# Patient Record
Sex: FEMALE | Race: BLACK OR AFRICAN AMERICAN | NOT HISPANIC OR LATINO | URBAN - METROPOLITAN AREA
[De-identification: names, ages, dates, MRNs, and addresses within clinical notes are randomized per-mention and may not be internally consistent; named-entity substitution may affect disease eponyms.]

---

## 2017-08-22 ENCOUNTER — EMERGENCY (EMERGENCY)
Facility: HOSPITAL | Age: 27
LOS: 1 days | Discharge: HOME | End: 2017-08-22
Admitting: EMERGENCY MEDICINE
Payer: COMMERCIAL

## 2017-08-22 VITALS
DIASTOLIC BLOOD PRESSURE: 90 MMHG | OXYGEN SATURATION: 100 % | SYSTOLIC BLOOD PRESSURE: 111 MMHG | RESPIRATION RATE: 18 BRPM | TEMPERATURE: 98 F | HEART RATE: 50 BPM

## 2017-08-22 PROCEDURE — 99283 EMERGENCY DEPT VISIT LOW MDM: CPT | Mod: 25

## 2017-08-22 PROCEDURE — 12001 RPR S/N/AX/GEN/TRNK 2.5CM/<: CPT

## 2017-08-22 RX ORDER — TETANUS TOXOID, REDUCED DIPHTHERIA TOXOID AND ACELLULAR PERTUSSIS VACCINE, ADSORBED 5; 2.5; 8; 8; 2.5 [IU]/.5ML; [IU]/.5ML; UG/.5ML; UG/.5ML; UG/.5ML
0.5 SUSPENSION INTRAMUSCULAR ONCE
Qty: 0 | Refills: 0 | Status: COMPLETED | OUTPATIENT
Start: 2017-08-22 | End: 2017-08-22

## 2017-08-22 RX ADMIN — TETANUS TOXOID, REDUCED DIPHTHERIA TOXOID AND ACELLULAR PERTUSSIS VACCINE, ADSORBED 0.5 MILLILITER(S): 5; 2.5; 8; 8; 2.5 SUSPENSION INTRAMUSCULAR at 17:20

## 2017-08-22 NOTE — ED PROVIDER NOTE - PLAN OF CARE
Follow up with your PMD within 48-72 hours for wound check. Keep sutures covered and dry for 24 hours then clean with soap and water three times daily.  Apply bacitracin and cover.  Return to ED for suture removal 7-10 days. Any increased pain, redness, streaking (red lines), swelling, fever, chills return to ER.

## 2017-08-22 NOTE — ED PROVIDER NOTE - CARE PLAN
Principal Discharge DX:	Laceration of ankle  Instructions for follow-up, activity and diet:	Follow up with your PMD within 48-72 hours for wound check. Keep sutures covered and dry for 24 hours then clean with soap and water three times daily.  Apply bacitracin and cover.  Return to ED for suture removal 7-10 days. Any increased pain, redness, streaking (red lines), swelling, fever, chills return to ER.

## 2017-08-22 NOTE — ED PROVIDER NOTE - MEDICAL DECISION MAKING DETAILS
R ankle laceration with glass- low suspicion for fb given the superficial nature of the area over the medial malleolus- will do exploration, irrigation and wound closure. Adacel ordered.

## 2017-08-22 NOTE — ED ADULT TRIAGE NOTE - CHIEF COMPLAINT QUOTE
pt states a glass drinking cup fell to the floor and broke on tile and bounced up and cut her right inner ankle.  c/o laceration to area.  band-aid intake.

## 2017-08-22 NOTE — ED PROVIDER NOTE - OBJECTIVE STATEMENT
26 year old female, no PMHx, presents to the ED complaining of a right ankle laceration. Patient states at 0730 she accidentally dropped a drinking glass and it shattered hitting her right medial ankle, causing a laceration. Patient immediately applied pressure and a dressing but has continued to bleed prompting her to come to the ED. Last tetanus unknown, patient able to bear weight, FROM, no numbness, tingling, fevers, chills or other complaints.

## 2017-08-30 ENCOUNTER — EMERGENCY (EMERGENCY)
Facility: HOSPITAL | Age: 27
LOS: 1 days | Discharge: HOME | End: 2017-08-30
Admitting: EMERGENCY MEDICINE

## 2017-08-30 VITALS
OXYGEN SATURATION: 99 % | TEMPERATURE: 98 F | DIASTOLIC BLOOD PRESSURE: 96 MMHG | SYSTOLIC BLOOD PRESSURE: 135 MMHG | HEART RATE: 58 BPM | RESPIRATION RATE: 18 BRPM

## 2017-08-30 NOTE — ED PROVIDER NOTE - OBJECTIVE STATEMENT
26 year old female, no PMHx, presents to the ED for suture removal. Sutures placed by myself x 1 wk ago. She accidentally dropped a glass which came up and hit her ankle causing a laceration. She has had no drainage, erythema, f/c/n/v/d, pain or other complaints.

## 2017-08-30 NOTE — ED PROVIDER NOTE - PHYSICAL EXAMINATION
1.5 cm laceration on right medial malleolus with four sutures in place, well healing. No erythema, no discharge, no drainage, no tenderness

## 2017-08-30 NOTE — ED PROVIDER NOTE - PLAN OF CARE
Follow up with your PMD within 48-72 hours for wound check. Keep sutures covered and dry for 24 hours then clean with soap and water three times daily.  Apply bacitracin and cover.  Any increased pain, redness, streaking (red lines), swelling, fever, chills return to ER.

## 2017-08-30 NOTE — ED PROCEDURE NOTE - CPROC ED SUTURE EQUIP USED1
forceps/antiseptic cleaning solution/gloves/cotton-tipped applications/povidone-iodine pads/suture scissors

## 2017-08-30 NOTE — ED PROVIDER NOTE - CARE PLAN
Principal Discharge DX:	Laceration of ankle  Instructions for follow-up, activity and diet:	Follow up with your PMD within 48-72 hours for wound check. Keep sutures covered and dry for 24 hours then clean with soap and water three times daily.  Apply bacitracin and cover.  Any increased pain, redness, streaking (red lines), swelling, fever, chills return to ER.

## 2018-04-12 ENCOUNTER — RESULT REVIEW (OUTPATIENT)
Age: 28
End: 2018-04-12

## 2018-06-11 ENCOUNTER — APPOINTMENT (OUTPATIENT)
Dept: INTERNAL MEDICINE | Facility: CLINIC | Age: 28
End: 2018-06-11

## 2018-10-19 ENCOUNTER — APPOINTMENT (OUTPATIENT)
Dept: INTERNAL MEDICINE | Facility: CLINIC | Age: 28
End: 2018-10-19

## 2018-10-30 ENCOUNTER — TRANSCRIPTION ENCOUNTER (OUTPATIENT)
Age: 28
End: 2018-10-30

## 2018-11-09 ENCOUNTER — OUTPATIENT (OUTPATIENT)
Dept: OUTPATIENT SERVICES | Facility: HOSPITAL | Age: 28
LOS: 1 days | End: 2018-11-09
Payer: COMMERCIAL

## 2018-11-09 ENCOUNTER — APPOINTMENT (OUTPATIENT)
Dept: ULTRASOUND IMAGING | Facility: IMAGING CENTER | Age: 28
End: 2018-11-09
Payer: COMMERCIAL

## 2018-11-09 DIAGNOSIS — Z00.8 ENCOUNTER FOR OTHER GENERAL EXAMINATION: ICD-10-CM

## 2018-11-09 PROCEDURE — 76770 US EXAM ABDO BACK WALL COMP: CPT

## 2018-11-09 PROCEDURE — 76770 US EXAM ABDO BACK WALL COMP: CPT | Mod: 26

## 2019-03-03 ENCOUNTER — EMERGENCY (EMERGENCY)
Facility: HOSPITAL | Age: 29
LOS: 1 days | Discharge: HOME | End: 2019-03-03
Attending: EMERGENCY MEDICINE | Admitting: EMERGENCY MEDICINE
Payer: COMMERCIAL

## 2019-03-03 VITALS
RESPIRATION RATE: 16 BRPM | HEART RATE: 66 BPM | TEMPERATURE: 98 F | DIASTOLIC BLOOD PRESSURE: 88 MMHG | OXYGEN SATURATION: 99 % | SYSTOLIC BLOOD PRESSURE: 131 MMHG

## 2019-03-03 VITALS
DIASTOLIC BLOOD PRESSURE: 89 MMHG | RESPIRATION RATE: 18 BRPM | TEMPERATURE: 98 F | OXYGEN SATURATION: 100 % | SYSTOLIC BLOOD PRESSURE: 162 MMHG | HEART RATE: 47 BPM

## 2019-03-03 LAB
ALBUMIN SERPL ELPH-MCNC: 4.4 G/DL — SIGNIFICANT CHANGE UP (ref 3.3–5)
ALP SERPL-CCNC: 47 U/L — SIGNIFICANT CHANGE UP (ref 40–120)
ALT FLD-CCNC: 16 U/L — SIGNIFICANT CHANGE UP (ref 4–33)
ANION GAP SERPL CALC-SCNC: 11 MMO/L — SIGNIFICANT CHANGE UP (ref 7–14)
AST SERPL-CCNC: 14 U/L — SIGNIFICANT CHANGE UP (ref 4–32)
BASOPHILS # BLD AUTO: 0.04 K/UL — SIGNIFICANT CHANGE UP (ref 0–0.2)
BASOPHILS NFR BLD AUTO: 0.6 % — SIGNIFICANT CHANGE UP (ref 0–2)
BILIRUB SERPL-MCNC: < 0.2 MG/DL — LOW (ref 0.2–1.2)
BUN SERPL-MCNC: 13 MG/DL — SIGNIFICANT CHANGE UP (ref 7–23)
CALCIUM SERPL-MCNC: 9.8 MG/DL — SIGNIFICANT CHANGE UP (ref 8.4–10.5)
CHLORIDE SERPL-SCNC: 99 MMOL/L — SIGNIFICANT CHANGE UP (ref 98–107)
CO2 SERPL-SCNC: 28 MMOL/L — SIGNIFICANT CHANGE UP (ref 22–31)
CREAT SERPL-MCNC: 0.81 MG/DL — SIGNIFICANT CHANGE UP (ref 0.5–1.3)
EOSINOPHIL # BLD AUTO: 0.1 K/UL — SIGNIFICANT CHANGE UP (ref 0–0.5)
EOSINOPHIL NFR BLD AUTO: 1.5 % — SIGNIFICANT CHANGE UP (ref 0–6)
GLUCOSE SERPL-MCNC: 94 MG/DL — SIGNIFICANT CHANGE UP (ref 70–99)
HCT VFR BLD CALC: 41.4 % — SIGNIFICANT CHANGE UP (ref 34.5–45)
HGB BLD-MCNC: 13.4 G/DL — SIGNIFICANT CHANGE UP (ref 11.5–15.5)
IMM GRANULOCYTES NFR BLD AUTO: 0.3 % — SIGNIFICANT CHANGE UP (ref 0–1.5)
LYMPHOCYTES # BLD AUTO: 3.45 K/UL — HIGH (ref 1–3.3)
LYMPHOCYTES # BLD AUTO: 53.2 % — HIGH (ref 13–44)
MCHC RBC-ENTMCNC: 27 PG — SIGNIFICANT CHANGE UP (ref 27–34)
MCHC RBC-ENTMCNC: 32.4 % — SIGNIFICANT CHANGE UP (ref 32–36)
MCV RBC AUTO: 83.5 FL — SIGNIFICANT CHANGE UP (ref 80–100)
MONOCYTES # BLD AUTO: 0.45 K/UL — SIGNIFICANT CHANGE UP (ref 0–0.9)
MONOCYTES NFR BLD AUTO: 6.9 % — SIGNIFICANT CHANGE UP (ref 2–14)
NEUTROPHILS # BLD AUTO: 2.42 K/UL — SIGNIFICANT CHANGE UP (ref 1.8–7.4)
NEUTROPHILS NFR BLD AUTO: 37.5 % — LOW (ref 43–77)
NRBC # FLD: 0 K/UL — LOW (ref 25–125)
PLATELET # BLD AUTO: 333 K/UL — SIGNIFICANT CHANGE UP (ref 150–400)
PMV BLD: 9.4 FL — SIGNIFICANT CHANGE UP (ref 7–13)
POTASSIUM SERPL-MCNC: 4.1 MMOL/L — SIGNIFICANT CHANGE UP (ref 3.5–5.3)
POTASSIUM SERPL-SCNC: 4.1 MMOL/L — SIGNIFICANT CHANGE UP (ref 3.5–5.3)
PROT SERPL-MCNC: 7.5 G/DL — SIGNIFICANT CHANGE UP (ref 6–8.3)
RBC # BLD: 4.96 M/UL — SIGNIFICANT CHANGE UP (ref 3.8–5.2)
RBC # FLD: 14.3 % — SIGNIFICANT CHANGE UP (ref 10.3–14.5)
SODIUM SERPL-SCNC: 138 MMOL/L — SIGNIFICANT CHANGE UP (ref 135–145)
TROPONIN T, HIGH SENSITIVITY: < 6 NG/L — SIGNIFICANT CHANGE UP (ref ?–14)
TROPONIN T, HIGH SENSITIVITY: < 6 NG/L — SIGNIFICANT CHANGE UP (ref ?–14)
WBC # BLD: 6.48 K/UL — SIGNIFICANT CHANGE UP (ref 3.8–10.5)
WBC # FLD AUTO: 6.48 K/UL — SIGNIFICANT CHANGE UP (ref 3.8–10.5)

## 2019-03-03 PROCEDURE — 93018 CV STRESS TEST I&R ONLY: CPT | Mod: GC

## 2019-03-03 PROCEDURE — 93016 CV STRESS TEST SUPVJ ONLY: CPT | Mod: GC

## 2019-03-03 PROCEDURE — 71046 X-RAY EXAM CHEST 2 VIEWS: CPT | Mod: 26

## 2019-03-03 PROCEDURE — 93010 ELECTROCARDIOGRAM REPORT: CPT | Mod: 59

## 2019-03-03 PROCEDURE — 99220: CPT

## 2019-03-03 PROCEDURE — 93306 TTE W/DOPPLER COMPLETE: CPT | Mod: 26

## 2019-03-03 RX ORDER — ACETAMINOPHEN 500 MG
975 TABLET ORAL ONCE
Qty: 0 | Refills: 0 | Status: COMPLETED | OUTPATIENT
Start: 2019-03-03 | End: 2019-03-03

## 2019-03-03 RX ORDER — ASPIRIN/CALCIUM CARB/MAGNESIUM 324 MG
324 TABLET ORAL ONCE
Qty: 0 | Refills: 0 | Status: COMPLETED | OUTPATIENT
Start: 2019-03-03 | End: 2019-03-03

## 2019-03-03 RX ADMIN — Medication 324 MILLIGRAM(S): at 04:05

## 2019-03-03 RX ADMIN — Medication 975 MILLIGRAM(S): at 04:05

## 2019-03-03 NOTE — ED CDU PROVIDER INITIAL DAY NOTE - ATTENDING CONTRIBUTION TO CARE
Dr. Porras: I performed a face to face bedside interview with patient regarding history of present illness, review of symptoms and past medical history. I completed an independent physical exam.  I have discussed patient's plan of care with PA.   I agree with note as stated above, having amended the EMR as needed to reflect my findings.   This includes HISTORY OF PRESENT ILLNESS, HIV, PAST MEDICAL/SURGICAL/FAMILY/SOCIAL HISTORY, ALLERGIES AND HOME MEDICATIONS, REVIEW OF SYSTEMS, PHYSICAL EXAM, and any PROGRESS NOTES during the time I functioned as the attending physician for this patient. HPI above as by me. PE above as by me. 28F worsening cp x 2 mo, trop negative x 2 CDU PLAN tele, stress test. Dr. Porras: I performed a face to face bedside interview with patient regarding history of present illness, review of symptoms and past medical history. I completed an independent physical exam.  I have discussed patient's plan of care with PA.   I agree with note as stated above, having amended the EMR as needed to reflect my findings.   This includes HISTORY OF PRESENT ILLNESS, HIV, PAST MEDICAL/SURGICAL/FAMILY/SOCIAL HISTORY, ALLERGIES AND HOME MEDICATIONS, REVIEW OF SYSTEMS, PHYSICAL EXAM, and any PROGRESS NOTES during the time I functioned as the attending physician for this patient. HPI above as by me. PE above as by me. 28F worsening cp x 2 mo, trop negative x 2 CDU PLAN tele, stress test, echo.

## 2019-03-03 NOTE — ED ADULT TRIAGE NOTE - CHIEF COMPLAINT QUOTE
Patient c/o generalized chest pain x2 months worsening last few weeks. Patient denies any difficulty breathing, palpitations. Patient states pain is "sharp" and intermittent. Denies any PMHx. Last took Excedrin 1000mg at 10pm. Patient c/o generalized chest pain x2 months worsening last few weeks. Patient denies any difficulty breathing, palpitations. Patient states pain is "sharp" and intermittent. Denies any PMHx. Last took Excedrin 1000mg at 10pm.    Addendum: Abnormal EKG, Charge RN notified.

## 2019-03-03 NOTE — ED CDU PROVIDER INITIAL DAY NOTE - MEDICAL DECISION MAKING DETAILS
28F worsening cp x 2 mo, hr 40-50s asymptomatic, trop negative x 2 CDU PLAN tele, stress test 28F worsening cp x 2 mo, hr 40-50s asymptomatic, trop negative x 2. Patient placed in CDU by ED team for stress-echo , however per 3/3 stress PA no stress-echo on Sundays CDU PLAN tele, stress, echo

## 2019-03-03 NOTE — ED ADULT NURSE NOTE - CHIEF COMPLAINT QUOTE
Patient c/o generalized chest pain x2 months worsening last few weeks. Patient denies any difficulty breathing, palpitations. Patient states pain is "sharp" and intermittent. Denies any PMHx. Last took Excedrin 1000mg at 10pm.    Addendum: Abnormal EKG, Charge RN notified.

## 2019-03-03 NOTE — ED CDU PROVIDER DISPOSITION NOTE - CLINICAL COURSE
28F neg pmh c/o 2 mo worsening cp. Per patient initially intermittent non-exertional cp now persistent non-exertional cp. Describes cp as a pinching sensation that originates over left breast and radiates intemittently to over right breast. Neg sob but notes cp worse with expiration. FH male first cousin on father's side  of MI in 30s. No cardiac disease in first degree relatives. NOn smoker. Patient placed in CDU by ED team for stress-echo. Stress test and echo normal. trops negative. No acute events on monitor. Dc home.

## 2019-03-03 NOTE — ED PROVIDER NOTE - CLINICAL SUMMARY MEDICAL DECISION MAKING FREE TEXT BOX
28 year old female wit unremarkable pmhx presenting with CP for 2 months worsening recently. Suggestive of cardiac cause. Will evaluate with EKG CXR CBC CMP troponin Tylenol ASA and will reassess.

## 2019-03-03 NOTE — ED PROVIDER NOTE - OBJECTIVE STATEMENT
28 year old female with unremarkable pmhx presenting with 2 months of CP. 28 year old female with unremarkable pmhx presenting with 2 months of CP. Has been initially episodic but now more persistent, occurring at rest, when laying down, or with exertion. Located in BL CP nonradiating 28 year old female with unremarkable pmhx presenting with 2 months of CP. Has been initially episodic but now more persistent, occurring at rest, when laying down, or with exertion. Located in BL CP non-radiating. Nothing makes it better or worse. States she had similar occurrence 5 years ago where she got dx with "chest spasms". Also endorsing HA.    Denies back pain, N/V, LOC, SOB, CP. 28 year old female with unremarkable pmhx presenting with 2 months of CP. Has been initially episodic but now more persistent, occurring at rest, when laying down, or with exertion. Located in BL CP non-radiating. Nothing makes it better or worse. States she had similar occurrence 5 years ago where she got dx with "chest spasms". Also endorsing HA.    FHx of cousin who  of MI in 30s.    Denies back pain, N/V, LOC, SOB, CP.

## 2019-03-03 NOTE — ED CDU PROVIDER DISPOSITION NOTE - ATTENDING CONTRIBUTION TO CARE
Dr. Porras: I performed a face to face bedside interview with patient regarding history of present illness, review of symptoms and past medical history. I completed an independent physical exam.  I have discussed patient's plan of care with PA.   I agree with note as stated above, having amended the EMR as needed to reflect my findings.   This includes HISTORY OF PRESENT ILLNESS, HIV, PAST MEDICAL/SURGICAL/FAMILY/SOCIAL HISTORY, ALLERGIES AND HOME MEDICATIONS, REVIEW OF SYSTEMS, PHYSICAL EXAM, and any PROGRESS NOTES during the time I functioned as the attending physician for this patient. HPI above as by me. PE above as by me. 28F neg pmh c/o 2 mo worsening cp. Per patient initially intermittent non-exertional cp now persistent non-exertional cp. Describes cp as a pinching sensation that originates over left breast and radiates intemittently to over right breast. Neg sob but notes cp worse with expiration. FH male first cousin on father's side  of MI in 30s. No cardiac disease in first degree relatives. NOn smoker. Patient placed in CDU by ED team for stress-echo. Stress test and echo normal. trops negative. No acute events on monitor. Dc home.

## 2019-03-03 NOTE — ED CDU PROVIDER INITIAL DAY NOTE - OBJECTIVE STATEMENT
07:07 Chiquita att: 28F neg pmh c/o 2 mo worsening cp. Per patient initially intermittent cp now persistent non-exertional cp, left rad to right chest, worse with expiration. FH first cousin  of MI in 30s. 07:07 Chiquita att: 28F neg pmh c/o 2 mo worsening cp. Per patient initially intermittent non-exertional cp now persistent non-exertional cp. Describes cp as a pinching sensation that originates over left breast and radiates intemittently to over right breast. Neg sob but notes cp worse with expiration. FH male first cousin on father's side  of MI in 30s. No cardiac disease in first degree relatives. NOn smoker. Patient placed in CDU by ED team for stress-echo.

## 2019-03-03 NOTE — ED ADULT NURSE NOTE - OBJECTIVE STATEMENT
Patient received in room #19 c/o chest pain x2 months worsening past few weeks. Patient A&Ox3, ambulatory. Patient denies sob, palpitations. Patient reports pain is intermittent and sharp. Sinus eric on CM. MD at bedside for evaluation. Patient placed on Pads on zoll. 20G IV Placed in right ac, labs drawn and sent. Will monitor.

## 2019-03-03 NOTE — ED CDU PROVIDER INITIAL DAY NOTE - PROGRESS NOTE DETAILS
Patient resting comfortably, states chest pain resolved. Results of imaging reviewed and discussed with patient. Stress test and echo normal. trops negative. No acute events on monitor. Discussed plan with MD, patient stable for dc home and outpatient follow up with PCP and cardiology. Pt understood and agreed with plan. All question and concerns addressed. Strict return instructions given. No complaints noted.

## 2019-03-03 NOTE — ED PROVIDER NOTE - ATTENDING CONTRIBUTION TO CARE
Laws: 28 yof with chest pain intermittently daily for 2 months starts at rest, bilateral anterior chest no radiation to back or arms. No sob. +cardiac history in 1st cousin. No smoking. No trauma. In ED, HR 45-55. BP high, no tn, eric no murmur, clear lungs, no edema, no jvd. EKG with eric. labs unremarkable,. pt agrees to stay in CDU for exercise stress echo and tele.

## 2019-03-03 NOTE — ED CDU PROVIDER DISPOSITION NOTE - NSFOLLOWUPINSTRUCTIONS_ED_ALL_ED_FT
Patient advised to follow up with PRIMARY CARE DOCTOR IN 1-2 DAYS AND CARDIOLOGIST 1-2 DAYS and told to return to the emergency department immediately for any new or concerning symptoms  OR ANY OTHER COMPLAINTS. Patient agrees with plan.    Take motrin or tylenol as needed for pain   Rest, avoid strenuous activity   Follow with cardiologist   RETURN IF ANY NEW OR WORSENING SYMPTOMS

## 2019-05-29 ENCOUNTER — RESULT REVIEW (OUTPATIENT)
Age: 29
End: 2019-05-29

## 2019-09-03 NOTE — ED ADULT TRIAGE NOTE - LOCATION:
Cc:  Pregnancy follow up.  Patient c/o pressure.  She reports some cramping.  No bleeding or spotting.  No leakage.  Fetal movement is excellent.  She is working on finding pediatrician.  Vitals reviewed by me.  Gen - alert and pleasant.  Abd - gravid, nontender, no guarding or rebound.  GBS negative.  A/P:  IUP at 38 weeks  - Discussed labor precautions.  - Maternal well being discussed.    I spent 15 minutes with patient and greater than 10 minutes in counseling face to face on above issues.   Left arm;

## 2020-01-21 ENCOUNTER — TRANSCRIPTION ENCOUNTER (OUTPATIENT)
Age: 30
End: 2020-01-21

## 2020-04-21 ENCOUNTER — TRANSCRIPTION ENCOUNTER (OUTPATIENT)
Age: 30
End: 2020-04-21

## 2020-04-22 ENCOUNTER — TRANSCRIPTION ENCOUNTER (OUTPATIENT)
Age: 30
End: 2020-04-22

## 2020-04-25 ENCOUNTER — MESSAGE (OUTPATIENT)
Age: 30
End: 2020-04-25

## 2020-05-07 LAB
SARS-COV-2 IGG SERPL IA-ACNC: 42.3 INDEX
SARS-COV-2 IGG SERPL QL IA: POSITIVE

## 2020-05-09 ENCOUNTER — APPOINTMENT (OUTPATIENT)
Dept: DISASTER EMERGENCY | Facility: HOSPITAL | Age: 30
End: 2020-05-09

## 2020-09-03 ENCOUNTER — RESULT REVIEW (OUTPATIENT)
Age: 30
End: 2020-09-03

## 2020-10-06 ENCOUNTER — TRANSCRIPTION ENCOUNTER (OUTPATIENT)
Age: 30
End: 2020-10-06

## 2020-10-15 ENCOUNTER — RESULT REVIEW (OUTPATIENT)
Age: 30
End: 2020-10-15

## 2020-10-29 ENCOUNTER — RESULT REVIEW (OUTPATIENT)
Age: 30
End: 2020-10-29

## 2020-11-27 ENCOUNTER — EMERGENCY (EMERGENCY)
Facility: HOSPITAL | Age: 30
LOS: 1 days | Discharge: HOME | End: 2020-11-27
Attending: EMERGENCY MEDICINE | Admitting: EMERGENCY MEDICINE
Payer: COMMERCIAL

## 2020-11-27 VITALS
TEMPERATURE: 97 F | SYSTOLIC BLOOD PRESSURE: 160 MMHG | HEART RATE: 65 BPM | HEIGHT: 66 IN | OXYGEN SATURATION: 100 % | DIASTOLIC BLOOD PRESSURE: 113 MMHG | RESPIRATION RATE: 18 BRPM

## 2020-11-27 PROCEDURE — 99283 EMERGENCY DEPT VISIT LOW MDM: CPT | Mod: 25

## 2020-11-27 PROCEDURE — 12001 RPR S/N/AX/GEN/TRNK 2.5CM/<: CPT

## 2020-11-27 RX ORDER — IBUPROFEN 200 MG
600 TABLET ORAL ONCE
Refills: 0 | Status: COMPLETED | OUTPATIENT
Start: 2020-11-27 | End: 2020-11-27

## 2020-11-27 RX ADMIN — Medication 600 MILLIGRAM(S): at 21:59

## 2020-11-27 NOTE — ED PROVIDER NOTE - PROGRESS NOTE DETAILS
PA KWESI: right 4th finger bleeding controlled, pain is controlled. Patient reassessed, sitting comfortably in chair in NAD, denies any complaints. States feeling better, symptoms improved. Discussed with attending, patient can be discharged home to f/u with PCP. The patient was given verbal and written discharge instructions. Specifically, instructions when to return to the ED and when to seek follow-up from their pcp was discussed. Any specialty follow-up was discussed, including how to make an appointment.  Instructions were discussed in simple, plain language and was understood by the patient. The patient understands that should their symptoms worsen or any new symptoms arise, they should return to the ED immediately for further evaluation. All pt's questions were answered. Patient verbalizes understanding.

## 2020-11-27 NOTE — ED PROVIDER NOTE - PATIENT PORTAL LINK FT
You can access the FollowMyHealth Patient Portal offered by Health system by registering at the following website: [email or web address]. By joining [facility] FollowMyHealth portal, you will also be able to view your health information using other applications (apps) compatible with our system.

## 2020-11-27 NOTE — ED PROVIDER NOTE - ATTENDING CONTRIBUTION TO CARE
DR. BOYKIN, ATTENDING MD-  I performed a face to face bedside interview with the patient regarding history of present illness, review of symptoms and past medical history. I completed an independent physical exam.  I have discussed the patient's plan of care with the PA.   Documentation as above in the note.    31 y/o female cut tip of right 4th finger with  today.  Tetanus utd.  Mild ooze blood from distal tip avulsion to finger.  Will obtain hemostasis, dc with pcp f/u.

## 2020-11-27 NOTE — ED PROCEDURE NOTE - PROCEDURE ADDITIONAL DETAILS
small avulsion laceration to right 4th finger distal phalanx, cannot be close with stitches. Surgicel applied to finger to achieve hemostasis, wrap with gauze and tape.

## 2020-11-27 NOTE — ED PROVIDER NOTE - OBJECTIVE STATEMENT
29 yo F with no significant PMH presents to ER c/o laceration of right 4th finger today. Pt states she accidently cut a piece of her finger with the  that was on the knives stand. Reports bleeding w/ a lot of pain. Admits tetanus up to date. Denies taking any pain medication. Denies any fever, chills, weakness, or any other complaints.

## 2020-11-27 NOTE — ED PROVIDER NOTE - NSFOLLOWUPINSTRUCTIONS_ED_ALL_ED_FT
Rest, drink plenty of fluids.  Advance activity as tolerated.  Elevate hand as tolerated. Keep bandage on finger until it falls off. Keep dressing clean and dry. Watch for any signs of infection including but not limited to fever, pus discharge, redness, swelling, increased in pain. Follow up with your primary care physician in 48-72 hours- bring copies of your results.  Return to the ER for worsening or persistent symptoms, and/or ANY NEW OR CONCERNING SYMPTOMS. If you have issues obtaining follow up, please call: [phone] to obtain a doctor or specialist who takes your insurance in your area.

## 2020-11-27 NOTE — ED PROVIDER NOTE - UPPER EXTREMITY EXAM, RIGHT
superficial laceration to hurtado aspect of right 4th distal phalanx, able to flex and extend at dip joint, pip joint, able to make a fist, sensation intact, cap refill<2 sec.

## 2020-11-27 NOTE — ED PROVIDER NOTE - CLINICAL SUMMARY MEDICAL DECISION MAKING FREE TEXT BOX
29 yo F with no significant PMH presents to ER c/o laceration of right 4th finger today. Superficial avulsion laceration to right distal phalanx, Tetanus up to date. Plan: wound care, hemostasis with Surgicel, pain control w/ med and digital block.

## 2021-01-04 NOTE — ED PROVIDER NOTE - RESPIRATORY, MLM
01/04/21 1415   General Information   Onset of Illness/Injury or Date of Surgery 12/30/20   Referring Physician Carline Lopez [facility]   Patient/Family Therapy Goal Statement (SLP) Agreed to POC goal   Pertinent History of Current Problem Per CNP note: Initially admitted with sepsis secondary to bacterial meningitis, acute infectious encephalopathy.  Later on the day of admission, while undergoing EEG monitoring the patient lost her airway protective reflexes requiring emergent intubation.  On 1/3, the patient went for MRI demonstrating possible purulent material versus less likely hemorrhage. Pt able to be successfully extubated 1/3.  Infectious diseases following for antibiotic recommendations, patient was transferred to hospitalist service on 1/4 for transfer out of the ICU.   General Observations Pt was alert, flat affect, impulsive with sips by cup.  Intermittent hoarse voicing observed.    Oral Motor   Oral Musculature   (Mild decreased labial ROM on left, slight pull of tongue left)   Dentition (Oral Motor)   Dentition (Oral Motor) adequate dentition   Vocal Quality/Secretion Management (Oral Motor)   Vocal Quality (Oral Motor) hoarse;hypophonic   Comment, Vocal Quality/Secretion Management (Oral Motor) mild cough on phlegm x 1   General Swallowing Observations   Current Diet/Method of Nutritional Intake (General Swallowing Observations, NIS) NPO   Respiratory Support (General Swallowing Observations) nasal cannula   Clinical Swallow Evaluation   Feeding Assistance frequent cues/help required   Clinical Swallow Eval: Thin Liquid Texture Trial   Mode of Presentation, Thin Liquids spoon;cup   Volume of Liquid or Food Presented ice chips x 3, sips by cup x 8+   Oral Phase of Swallow Premature pharyngeal entry   Pharyngeal Phase of Swallow   (slight delay suspected)   Diagnostic Statement cough x 1 with large impulsive consecutive swallow   Clinical Swallow Evaluation: Puree Solid Texture Trial   Mode of  Presentation, Puree spoon;fed by clinician   Volume of Puree Presented tsps x 2   Oral Phase, Puree WFL   Pharyngeal Phase, Puree intact   Diagnostic Statement no signs of aspiration    Clinical Swallow Evaluation: Semisolid Texture Trial   Mode of Presentation, Semisolid spoon   Volume of Semisolid Food Presented tsps x 2   Oral Phase, Semisolid WFL   Pharyngeal Phase, Semisolid intact   Diagnostic Statement no signs of aspiration    Clinical Swallow Evaluation: Solid Food Texture Trial   Mode of Presentation, Solid fed by clinician   Volume of Solid Food Presented bites x 4   Oral Phase, Solid   (mild oral residue)   Pharyngeal Phase, Solid intact   Diagnostic Statement no signs of aspiration, min-mod cues given to alternate to thin liquids which cleared oral cavity   Esophageal Phase of Swallow   Patient reports or presents with symptoms of esophageal dysphagia No   Swallowing Recommendations   Diet Consistency Recommendations regular diet;thin liquids   Supervision Level for Intake 1:1 supervision needed   Mode of Delivery Recommendations no straws;bolus size, small;slow rate of intake   Swallowing Maneuver Recommendations alternate food and liquid intake   Monitoring/Assistance Required (Eating/Swallowing) monitor for cough or change in vocal quality with intake   Recommended Feeding/Eating Techniques (Swallow Eval) maintain upright sitting position for eating   Medication Administration Recommendations, Swallowing (SLP) 1 pill at a time, use puree as needed   Instrumental Assessment Recommendations   (to be determined)   SLP Therapy Assessment/Plan   Criteria for Skilled Therapeutic Interventions Met (SLP Eval) yes   SLP Diagnosis Mild oral-pharyngeal dysphagia   Rehab Potential (SLP Eval) good, to achieve stated therapy goals   Therapy Frequency (SLP Eval) 5 times/wk   Predicted Duration of Therapy Intervention (SLP Eval) 1 week   Comment, Therapy Assessment/Plan (SLP) Mild oral-pharyngeal dysphagia was found  during today's bedside SLP eval.  Deficits/risk factors include recent intubation, hoarse weak voicing, min oral motor deficits on left, and slight swallow delay suspected.  Cough x 1 noted after large impulsive consecutive swallow of thin liquid. Recommend cautious initiation of a regular diet and thin liquids with 1:1 assist/supervision and cues to use safe swallow strategies, no straw, small sips.  Thicken liquids to nectar thick if aspiration signs noted with thin. SLP to follow.    Therapy Plan Review/Discharge Plan (SLP)   Therapy Plan Review (SLP) evaluation/treatment results reviewed;care plan/treatment goals reviewed;risks/benefits reviewed;current/potential barriers reviewed;participants voiced agreement with care plan;participants included;patient   SLP Discharge Planning    SLP Discharge Recommendation (DC Rec) home   SLP Rationale for DC Rec Anticipate swallow Tx goal will be met as an IP; cognitive-linguistic eval and Tx as indicated after discharge-needs to be determined   SLP Brief overview of current status  Mild oral-pharyngeal dysphagia observed per SLP eval.  Recommend cautious initiation of a regular diet and thin liquids with 1:1 assist/supervision and cues to use safe swallow strategies.  Thicken liquids to nectar thick if aspiration signs noted with thin. SLP to follow.     Total Evaluation Time   Total Evaluation Time (Minutes) 15      Breath sounds clear and equal bilaterally.

## 2021-01-17 ENCOUNTER — TRANSCRIPTION ENCOUNTER (OUTPATIENT)
Age: 31
End: 2021-01-17

## 2021-03-18 ENCOUNTER — RESULT REVIEW (OUTPATIENT)
Age: 31
End: 2021-03-18

## 2021-03-28 ENCOUNTER — TRANSCRIPTION ENCOUNTER (OUTPATIENT)
Age: 31
End: 2021-03-28

## 2021-04-02 ENCOUNTER — EMERGENCY (EMERGENCY)
Facility: HOSPITAL | Age: 31
LOS: 1 days | Discharge: HOME | End: 2021-04-02
Admitting: INTERNAL MEDICINE
Payer: COMMERCIAL

## 2021-04-02 VITALS
RESPIRATION RATE: 18 BRPM | SYSTOLIC BLOOD PRESSURE: 155 MMHG | TEMPERATURE: 98 F | DIASTOLIC BLOOD PRESSURE: 99 MMHG | HEIGHT: 66 IN | OXYGEN SATURATION: 100 % | HEART RATE: 62 BPM

## 2021-04-02 PROCEDURE — 93010 ELECTROCARDIOGRAM REPORT: CPT

## 2021-04-02 PROCEDURE — 99285 EMERGENCY DEPT VISIT HI MDM: CPT | Mod: 25

## 2021-04-02 RX ORDER — SODIUM CHLORIDE 9 MG/ML
1000 INJECTION INTRAMUSCULAR; INTRAVENOUS; SUBCUTANEOUS ONCE
Refills: 0 | Status: COMPLETED | OUTPATIENT
Start: 2021-04-02 | End: 2021-04-02

## 2021-04-02 RX ORDER — KETOROLAC TROMETHAMINE 30 MG/ML
15 SYRINGE (ML) INJECTION ONCE
Refills: 0 | Status: DISCONTINUED | OUTPATIENT
Start: 2021-04-02 | End: 2021-04-02

## 2021-04-02 RX ADMIN — SODIUM CHLORIDE 1000 MILLILITER(S): 9 INJECTION INTRAMUSCULAR; INTRAVENOUS; SUBCUTANEOUS at 23:52

## 2021-04-02 RX ADMIN — Medication 15 MILLIGRAM(S): at 23:56

## 2021-04-02 NOTE — ED ADULT NURSE NOTE - OBJECTIVE STATEMENT
31 y/o female presents to ED complaining of intermittent R sided chest discomfort. Pt a&ox4, hx of bradycardia, endorses a "pulling" pain on the R side of chest that radiates her rib and back. Pt endorses pain starting at 2 pm and increasing around 4pm.   Pt denies fever, chills, sob, n/v/d. 18g IV placed to LAC. Labs obtained as ordered. Will monitor.

## 2021-04-02 NOTE — ED ADULT TRIAGE NOTE - CHIEF COMPLAINT QUOTE
pt st " I am feeling a pain while at work today around 4-5 pm when I inhale I feel a pulling pain in rt side of chest radiating down into rib area...I had this episode on Sunday drank tea went away after 6 hours." Denies nausea. Pt st" being monitored for HTN, hx of bradycardia.

## 2021-04-03 VITALS — DIASTOLIC BLOOD PRESSURE: 105 MMHG | RESPIRATION RATE: 18 BRPM | HEART RATE: 65 BPM | SYSTOLIC BLOOD PRESSURE: 170 MMHG

## 2021-04-03 LAB
ALBUMIN SERPL ELPH-MCNC: 4.6 G/DL — SIGNIFICANT CHANGE UP (ref 3.3–5)
ALP SERPL-CCNC: 57 U/L — SIGNIFICANT CHANGE UP (ref 40–120)
ALT FLD-CCNC: 15 U/L — SIGNIFICANT CHANGE UP (ref 4–33)
ANION GAP SERPL CALC-SCNC: 8 MMOL/L — SIGNIFICANT CHANGE UP (ref 7–14)
APTT BLD: 35 SEC — SIGNIFICANT CHANGE UP (ref 27–36.3)
AST SERPL-CCNC: 15 U/L — SIGNIFICANT CHANGE UP (ref 4–32)
BASOPHILS # BLD AUTO: 0.03 K/UL — SIGNIFICANT CHANGE UP (ref 0–0.2)
BASOPHILS NFR BLD AUTO: 0.6 % — SIGNIFICANT CHANGE UP (ref 0–2)
BILIRUB SERPL-MCNC: <0.2 MG/DL — SIGNIFICANT CHANGE UP (ref 0.2–1.2)
BUN SERPL-MCNC: 10 MG/DL — SIGNIFICANT CHANGE UP (ref 7–23)
CALCIUM SERPL-MCNC: 9.2 MG/DL — SIGNIFICANT CHANGE UP (ref 8.4–10.5)
CHLORIDE SERPL-SCNC: 103 MMOL/L — SIGNIFICANT CHANGE UP (ref 98–107)
CO2 SERPL-SCNC: 27 MMOL/L — SIGNIFICANT CHANGE UP (ref 22–31)
CREAT SERPL-MCNC: 0.62 MG/DL — SIGNIFICANT CHANGE UP (ref 0.5–1.3)
D DIMER BLD IA.RAPID-MCNC: <150 NG/ML DDU — SIGNIFICANT CHANGE UP
EOSINOPHIL # BLD AUTO: 0.13 K/UL — SIGNIFICANT CHANGE UP (ref 0–0.5)
EOSINOPHIL NFR BLD AUTO: 2.4 % — SIGNIFICANT CHANGE UP (ref 0–6)
GLUCOSE SERPL-MCNC: 84 MG/DL — SIGNIFICANT CHANGE UP (ref 70–99)
HCT VFR BLD CALC: 39 % — SIGNIFICANT CHANGE UP (ref 34.5–45)
HGB BLD-MCNC: 12.6 G/DL — SIGNIFICANT CHANGE UP (ref 11.5–15.5)
IANC: 2.02 K/UL — SIGNIFICANT CHANGE UP (ref 1.5–8.5)
IMM GRANULOCYTES NFR BLD AUTO: 0.2 % — SIGNIFICANT CHANGE UP (ref 0–1.5)
INR BLD: 1.01 RATIO — SIGNIFICANT CHANGE UP (ref 0.88–1.16)
LYMPHOCYTES # BLD AUTO: 2.9 K/UL — SIGNIFICANT CHANGE UP (ref 1–3.3)
LYMPHOCYTES # BLD AUTO: 53.2 % — HIGH (ref 13–44)
MCHC RBC-ENTMCNC: 26.6 PG — LOW (ref 27–34)
MCHC RBC-ENTMCNC: 32.3 GM/DL — SIGNIFICANT CHANGE UP (ref 32–36)
MCV RBC AUTO: 82.3 FL — SIGNIFICANT CHANGE UP (ref 80–100)
MONOCYTES # BLD AUTO: 0.36 K/UL — SIGNIFICANT CHANGE UP (ref 0–0.9)
MONOCYTES NFR BLD AUTO: 6.6 % — SIGNIFICANT CHANGE UP (ref 2–14)
NEUTROPHILS # BLD AUTO: 2.02 K/UL — SIGNIFICANT CHANGE UP (ref 1.8–7.4)
NEUTROPHILS NFR BLD AUTO: 37 % — LOW (ref 43–77)
NRBC # BLD: 0 /100 WBCS — SIGNIFICANT CHANGE UP
NRBC # FLD: 0 K/UL — SIGNIFICANT CHANGE UP
PLATELET # BLD AUTO: 317 K/UL — SIGNIFICANT CHANGE UP (ref 150–400)
POTASSIUM SERPL-MCNC: 4 MMOL/L — SIGNIFICANT CHANGE UP (ref 3.5–5.3)
POTASSIUM SERPL-SCNC: 4 MMOL/L — SIGNIFICANT CHANGE UP (ref 3.5–5.3)
PROT SERPL-MCNC: 7.5 G/DL — SIGNIFICANT CHANGE UP (ref 6–8.3)
PROTHROM AB SERPL-ACNC: 11.6 SEC — SIGNIFICANT CHANGE UP (ref 10.6–13.6)
RBC # BLD: 4.74 M/UL — SIGNIFICANT CHANGE UP (ref 3.8–5.2)
RBC # FLD: 13.7 % — SIGNIFICANT CHANGE UP (ref 10.3–14.5)
SODIUM SERPL-SCNC: 138 MMOL/L — SIGNIFICANT CHANGE UP (ref 135–145)
TROPONIN T, HIGH SENSITIVITY RESULT: 8 NG/L — SIGNIFICANT CHANGE UP
WBC # BLD: 5.45 K/UL — SIGNIFICANT CHANGE UP (ref 3.8–10.5)
WBC # FLD AUTO: 5.45 K/UL — SIGNIFICANT CHANGE UP (ref 3.8–10.5)

## 2021-04-03 PROCEDURE — 71046 X-RAY EXAM CHEST 2 VIEWS: CPT | Mod: 26

## 2021-04-03 NOTE — ED PROVIDER NOTE - NSFOLLOWUPINSTRUCTIONS_ED_ALL_ED_FT
Rest, drink plenty of fluids.  Advance activity as tolerated.  Continue all previously prescribed medications as directed.  Follow up with your primary care physician and cardiologist in 48-72 hours- bring copies of your results. Call on referral list given for next available appointment. Return to the ER for worsening or persistent symptoms, and/or ANY NEW OR CONCERNING SYMPTOMS. If you have issues obtaining follow up, please call: [phone] to obtain a doctor or specialist who takes your insurance in your area.

## 2021-04-03 NOTE — ED PROVIDER NOTE - CLINICAL SUMMARY MEDICAL DECISION MAKING FREE TEXT BOX
29 yo F, nonsmoker with PMH of bradycardia presents to ER c/o right chest pain today while sitting at work, pleuritic, non-exertional. afebrile. no URI symptoms. The patient's risk factors for ACS were reviewed as well as the EKG.  The CXR assists in r/o Pneumonia, Pneumothorax, Esophageal Tears.  The patient does not appear to have a Pulmonary Embolism based on the Wells Score and there is no apparent DVT. PERC neg. There does not appear to be an Aortic Dissection either based on history, physical exam, and signs. Likely costochondritis. Plan: labs, trop, d-dimer, CXR, pain control, IVF for hydration and reassess.

## 2021-04-03 NOTE — ED PROVIDER NOTE - OBJECTIVE STATEMENT
29 yo F, nonsmoker with PMH of bradycardia presents to ER c/o right chest pain today while sitting at work. Pt states having "pulling" pain in right chest area, /10, intermittent, worse with movements and inspiration. Reports having similar pain on , resolved after drinking tea. 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 States her blood pressure has been high and she was recently seen at urgent care, told her heart rate is low and has cardiology appointment next Thursday. Admits hx of low heart rate. Pt denies any associated symptoms, including f/c/n/v, near syncope, palpitation, diaphoresis, cough, sob, weakness, any recent trauma or injury to chest, lower extremity edema. Denies any prior history of DVT/PE, hx of malignancy or known hypercoagulable state. Admits sister  from

## 2021-04-03 NOTE — ED PROVIDER NOTE - PATIENT PORTAL LINK FT
You can access the FollowMyHealth Patient Portal offered by [facility] by registering at the following website: [email or web address]. By joining [facility]’s FollowMyHealth portal, you will also be able to view your health information using other applications (apps) compatible with our system.

## 2021-04-03 NOTE — ED ADULT NURSE REASSESSMENT NOTE - NS ED NURSE REASSESS COMMENT FT1
MD made aware of patients BP at this time. Pt offering no complaints and with no new onset of symptoms including HA and vision change. Pt remains in no acute distress. Respirations even and unlabored. Vitals per flowsheet. Callbell remains in reach.

## 2021-04-03 NOTE — ED PROVIDER NOTE - PROGRESS NOTE DETAILS
PA KWESI: Patient reassessed, sitting comfortably in chair in NAD, denies any complaints. States feeling better, symptoms improved. d-dimer neg, trop 8, CXR no emergent finding (wet read). Pt has appt with cardiology next week Thursday. Return precaution given.  Pt is medically stable for discharge and follow up with PMD & cardiology. The patient was given verbal and written discharge instructions. Specifically, instructions when to return to the ED and when to seek follow-up from their pcp was discussed. Any specialty follow-up was discussed, including how to make an appointment.  Instructions were discussed in simple, plain language and was understood by the patient. The patient understands that should their symptoms worsen or any new symptoms arise, they should return to the ED immediately for further evaluation. All pt's questions were answered. Patient verbalizes understanding.

## 2021-04-06 ENCOUNTER — TRANSCRIPTION ENCOUNTER (OUTPATIENT)
Age: 31
End: 2021-04-06

## 2021-04-08 ENCOUNTER — NON-APPOINTMENT (OUTPATIENT)
Age: 31
End: 2021-04-08

## 2021-04-08 ENCOUNTER — APPOINTMENT (OUTPATIENT)
Dept: CARDIOLOGY | Facility: CLINIC | Age: 31
End: 2021-04-08

## 2021-04-08 ENCOUNTER — APPOINTMENT (OUTPATIENT)
Dept: CARDIOLOGY | Facility: CLINIC | Age: 31
End: 2021-04-08
Payer: COMMERCIAL

## 2021-04-08 VITALS
DIASTOLIC BLOOD PRESSURE: 80 MMHG | OXYGEN SATURATION: 99 % | SYSTOLIC BLOOD PRESSURE: 140 MMHG | TEMPERATURE: 98.5 F | HEART RATE: 60 BPM | BODY MASS INDEX: 30.37 KG/M2 | HEIGHT: 66 IN | WEIGHT: 189 LBS

## 2021-04-08 DIAGNOSIS — Z82.49 FAMILY HISTORY OF ISCHEMIC HEART DISEASE AND OTHER DISEASES OF THE CIRCULATORY SYSTEM: ICD-10-CM

## 2021-04-08 DIAGNOSIS — R00.1 BRADYCARDIA, UNSPECIFIED: ICD-10-CM

## 2021-04-08 DIAGNOSIS — R07.89 OTHER CHEST PAIN: ICD-10-CM

## 2021-04-08 DIAGNOSIS — Z83.3 FAMILY HISTORY OF DIABETES MELLITUS: ICD-10-CM

## 2021-04-08 PROCEDURE — 93000 ELECTROCARDIOGRAM COMPLETE: CPT

## 2021-04-08 PROCEDURE — 99072 ADDL SUPL MATRL&STAF TM PHE: CPT

## 2021-04-08 PROCEDURE — 99204 OFFICE O/P NEW MOD 45 MIN: CPT

## 2021-04-08 NOTE — PHYSICAL EXAM
[General Appearance - Well Developed] : well developed [Normal Appearance] : normal appearance [Well Groomed] : well groomed [General Appearance - Well Nourished] : well nourished [No Deformities] : no deformities [General Appearance - In No Acute Distress] : no acute distress [Normal Conjunctiva] : the conjunctiva exhibited no abnormalities [Eyelids - No Xanthelasma] : the eyelids demonstrated no xanthelasmas [Normal Oral Mucosa] : normal oral mucosa [No Oral Pallor] : no oral pallor [No Oral Cyanosis] : no oral cyanosis [Normal Jugular Venous A Waves Present] : normal jugular venous A waves present [Normal Jugular Venous V Waves Present] : normal jugular venous V waves present [No Jugular Venous Thompson A Waves] : no jugular venous thompson A waves [Heart Sounds] : normal S1 and S2 [Heart Rate And Rhythm] : heart rate and rhythm were normal [Murmurs] : no murmurs present [Edema] : no peripheral edema present [1+] : left 1+ [Exaggerated Use Of Accessory Muscles For Inspiration] : no accessory muscle use [Respiration, Rhythm And Depth] : normal respiratory rhythm and effort [Auscultation Breath Sounds / Voice Sounds] : lungs were clear to auscultation bilaterally [Bowel Sounds] : normal bowel sounds [Abdomen Soft] : soft [Abdomen Tenderness] : non-tender [Abdomen Mass (___ Cm)] : no abdominal mass palpated [Abnormal Walk] : normal gait [Gait - Sufficient For Exercise Testing] : the gait was sufficient for exercise testing [Nail Clubbing] : no clubbing of the fingernails [Cyanosis, Localized] : no localized cyanosis [Petechial Hemorrhages (___cm)] : no petechial hemorrhages [Skin Color & Pigmentation] : normal skin color and pigmentation [] : no rash [No Venous Stasis] : no venous stasis [No Skin Ulcers] : no skin ulcer [Skin Lesions] : no skin lesions [No Xanthoma] : no  xanthoma was observed [Oriented To Time, Place, And Person] : oriented to person, place, and time [Affect] : the affect was normal [Mood] : the mood was normal [No Anxiety] : not feeling anxious [Right Carotid Bruit] : no bruit heard over the right carotid [Left Carotid Bruit] : no bruit heard over the left carotid [Bruit] : no bruit heard

## 2021-04-08 NOTE — DISCUSSION/SUMMARY
[___ Week(s)] : [unfilled] week(s) [FreeTextEntry3] : Echo, regular stress test, Holter monitor; 1 month office visit follow-up [FreeTextEntry1] : She will follow her home blood pressure and heart rate readings and I have given her a prescription for amlodipine 2.5 mg once daily as needed for significantly elevated blood pressure readings.  I have ordered an echocardiogram to assess LV function and valvular status.  I will order a regular treadmill stress test to assess cardiac fitness and rule out any provocable ECG changes as well as check vital assessment to exercise.  I've ordered a 24-hour Holter monitor to assess for dysrhythmias including bradycardia arrhythmias or tachycardia arrhythmias.  I recommended a heart healthy lifestyle including a low-saturated fat, low cholesterol diet with improved aerobic physical fitness over time for cardiovascular benefits.  Carbohydrate and sodium restriction along with weight loss over time encouraged.  We will call the patient with test results and followup with you for general care.  Follow-up in 1 month or sooner if needed.  She tells me she has contraceptive IUD device and I suggested she touch base with GYN regarding formulation as certain formulations may possibly contribute to elevated blood pressures.

## 2021-04-08 NOTE — HISTORY OF PRESENT ILLNESS
[FreeTextEntry1] : Rosanne is a pleasant 30-year-old female with history of borderline hypertension, mild obesity by BMI, history of ovarian cystectomy, and recent chest pain syndrome which sounds atypical not directly effort-induced.  She went to the local emergency department and was evaluated and released in good condition.  I reviewed the available labs and cardiac data.  She is seen to have sinus bradycardia with a hypertensive heart disease/LVH appearing ECG which is stable compared with the ECG obtained from prior hospital ER visits.  No current shortness of breath, palpitations, syncope or edema.

## 2021-05-05 ENCOUNTER — APPOINTMENT (OUTPATIENT)
Dept: CARDIOLOGY | Facility: CLINIC | Age: 31
End: 2021-05-05
Payer: COMMERCIAL

## 2021-05-05 PROCEDURE — 93306 TTE W/DOPPLER COMPLETE: CPT

## 2021-05-05 PROCEDURE — 99072 ADDL SUPL MATRL&STAF TM PHE: CPT

## 2021-05-18 ENCOUNTER — APPOINTMENT (OUTPATIENT)
Dept: CARDIOLOGY | Facility: CLINIC | Age: 31
End: 2021-05-18
Payer: COMMERCIAL

## 2021-05-18 PROCEDURE — 99072 ADDL SUPL MATRL&STAF TM PHE: CPT

## 2021-05-18 PROCEDURE — 93015 CV STRESS TEST SUPVJ I&R: CPT

## 2021-09-15 ENCOUNTER — APPOINTMENT (OUTPATIENT)
Dept: CARDIOLOGY | Facility: CLINIC | Age: 31
End: 2021-09-15
Payer: COMMERCIAL

## 2021-09-15 VITALS
BODY MASS INDEX: 30.05 KG/M2 | WEIGHT: 187 LBS | HEART RATE: 59 BPM | OXYGEN SATURATION: 99 % | SYSTOLIC BLOOD PRESSURE: 140 MMHG | HEIGHT: 66 IN | TEMPERATURE: 97.6 F | DIASTOLIC BLOOD PRESSURE: 82 MMHG

## 2021-09-15 DIAGNOSIS — I10 ESSENTIAL (PRIMARY) HYPERTENSION: ICD-10-CM

## 2021-09-15 PROCEDURE — 99213 OFFICE O/P EST LOW 20 MIN: CPT

## 2021-09-15 NOTE — PHYSICAL EXAM
[General Appearance - Well Developed] : well developed [Normal Appearance] : normal appearance [Well Groomed] : well groomed [General Appearance - Well Nourished] : well nourished [No Deformities] : no deformities [General Appearance - In No Acute Distress] : no acute distress [Normal Conjunctiva] : the conjunctiva exhibited no abnormalities [Eyelids - No Xanthelasma] : the eyelids demonstrated no xanthelasmas [Normal Oral Mucosa] : normal oral mucosa [No Oral Pallor] : no oral pallor [No Oral Cyanosis] : no oral cyanosis [Normal Jugular Venous A Waves Present] : normal jugular venous A waves present [Normal Jugular Venous V Waves Present] : normal jugular venous V waves present [No Jugular Venous Thompson A Waves] : no jugular venous thompson A waves [Respiration, Rhythm And Depth] : normal respiratory rhythm and effort [Exaggerated Use Of Accessory Muscles For Inspiration] : no accessory muscle use [Auscultation Breath Sounds / Voice Sounds] : lungs were clear to auscultation bilaterally [Heart Rate And Rhythm] : heart rate and rhythm were normal [Heart Sounds] : normal S1 and S2 [Murmurs] : no murmurs present [Bowel Sounds] : normal bowel sounds [Abdomen Soft] : soft [Abdomen Tenderness] : non-tender [Abnormal Walk] : normal gait [Gait - Sufficient For Exercise Testing] : the gait was sufficient for exercise testing [Nail Clubbing] : no clubbing of the fingernails [Cyanosis, Localized] : no localized cyanosis [Petechial Hemorrhages (___cm)] : no petechial hemorrhages [Skin Color & Pigmentation] : normal skin color and pigmentation [] : no rash [No Venous Stasis] : no venous stasis [Skin Lesions] : no skin lesions [No Skin Ulcers] : no skin ulcer [No Xanthoma] : no  xanthoma was observed [Oriented To Time, Place, And Person] : oriented to person, place, and time [Affect] : the affect was normal [Mood] : the mood was normal [No Anxiety] : not feeling anxious

## 2021-09-15 NOTE — HISTORY OF PRESENT ILLNESS
[Preoperative Visit] : for a medical evaluation prior to surgery [Scheduled Procedure ___] : a [unfilled] [Date of Surgery ___] : on [unfilled] [Surgeon Name ___] : surgeon: [unfilled] [Good] : Good [Stable] : Stable [de-identified] : Phone number: [phone];  fax number [phone] [FreeTextEntry1] : Sindy follows up today for preoperative assessment and cardiac clearance for upcoming breast reduction surgery.  Previously seen by me for an abnormal ECG and atypical chest pain symptoms, she underwent an echo, stress test and Holter monitor.  I reviewed the available test findings with her indicating normal biventricular size and systolic function with no significant valvular heart disease.  She did have borderline hypertension.  She is not found to need to take her amlodipine 2.5 mg daily as directed which I prescribed previously.  No current chest symptoms and she is eating healthier.  Is also lost some weight.  Recent ECG is stable showing no dynamic changes evidence of sinus bradycardia with nonspecific T wave abnormality and possible LVH.

## 2021-09-20 ENCOUNTER — TRANSCRIPTION ENCOUNTER (OUTPATIENT)
Age: 31
End: 2021-09-20

## 2021-09-20 ENCOUNTER — OUTPATIENT (OUTPATIENT)
Dept: OUTPATIENT SERVICES | Facility: HOSPITAL | Age: 31
LOS: 1 days | End: 2021-09-20
Payer: COMMERCIAL

## 2021-09-20 VITALS
DIASTOLIC BLOOD PRESSURE: 88 MMHG | HEART RATE: 45 BPM | SYSTOLIC BLOOD PRESSURE: 144 MMHG | TEMPERATURE: 97 F | RESPIRATION RATE: 17 BRPM | WEIGHT: 182.1 LBS | OXYGEN SATURATION: 99 % | HEIGHT: 66 IN

## 2021-09-20 DIAGNOSIS — N62 HYPERTROPHY OF BREAST: ICD-10-CM

## 2021-09-20 DIAGNOSIS — Z01.818 ENCOUNTER FOR OTHER PREPROCEDURAL EXAMINATION: ICD-10-CM

## 2021-09-20 PROBLEM — R00.1 BRADYCARDIA, SINUS: Status: ACTIVE | Noted: 2021-04-08

## 2021-09-20 PROCEDURE — G0463: CPT

## 2021-09-20 NOTE — H&P PST ADULT - HISTORY OF PRESENT ILLNESS
This30 hx of HTN, elevated mildly anticardiolipin IGM (13), PCOS   presents to PST for a scheduled b/l breast reduction   with Dr Lopez  on 9/21/21 . Pt is electing to have this procedure.

## 2021-09-20 NOTE — H&P PST ADULT - ASSESSMENT
This30 hx of HTN, elevated mildly anticardiolipin IGM (13), PCOS   presents to PST for a scheduled b/l breast reduction   with Dr Lopez  on 9/21/21 .

## 2021-09-20 NOTE — H&P PST ADULT - PROBLEM SELECTOR PLAN 1
NO PST today, all labs/ekg done at PCP.  Medical evaluation with Dr. Estrada received.   Cardiology clearance with Dr Laura received  Hemetology note  with Dr. Gan received.  All preoperative instructions including CHD cleanse reviewed with patient who verbalized understanding.   Avoid all NSAID/ASA containing products as well as all herbal/vitamin supplements. Tylenol only for pain/headache.   Fully vaccinated; Denies recent international travel, recent illness and sick contact with COVID in the last 3 weeks Covid test done 9/18/21 and is negative.

## 2021-09-21 ENCOUNTER — RESULT REVIEW (OUTPATIENT)
Age: 31
End: 2021-09-21

## 2021-09-21 ENCOUNTER — OUTPATIENT (OUTPATIENT)
Dept: OUTPATIENT SERVICES | Facility: HOSPITAL | Age: 31
LOS: 1 days | End: 2021-09-21
Payer: COMMERCIAL

## 2021-09-21 VITALS
TEMPERATURE: 98 F | HEART RATE: 56 BPM | OXYGEN SATURATION: 98 % | DIASTOLIC BLOOD PRESSURE: 80 MMHG | RESPIRATION RATE: 14 BRPM | WEIGHT: 182.1 LBS | HEIGHT: 66 IN | SYSTOLIC BLOOD PRESSURE: 142 MMHG

## 2021-09-21 VITALS
DIASTOLIC BLOOD PRESSURE: 94 MMHG | SYSTOLIC BLOOD PRESSURE: 138 MMHG | HEART RATE: 78 BPM | OXYGEN SATURATION: 96 % | RESPIRATION RATE: 14 BRPM

## 2021-09-21 DIAGNOSIS — N62 HYPERTROPHY OF BREAST: ICD-10-CM

## 2021-09-21 LAB — HCG UR QL: NEGATIVE — SIGNIFICANT CHANGE UP

## 2021-09-21 PROCEDURE — 15877 SUCTION LIPECTOMY TRUNK: CPT | Mod: XU

## 2021-09-21 PROCEDURE — 19318 BREAST REDUCTION: CPT

## 2021-09-21 PROCEDURE — 19318 BREAST REDUCTION: CPT | Mod: AS,50

## 2021-09-21 PROCEDURE — 15777 ACELLULAR DERM MATRIX IMPLT: CPT

## 2021-09-21 PROCEDURE — 81025 URINE PREGNANCY TEST: CPT

## 2021-09-21 PROCEDURE — 88305 TISSUE EXAM BY PATHOLOGIST: CPT | Mod: 26

## 2021-09-21 PROCEDURE — 88305 TISSUE EXAM BY PATHOLOGIST: CPT

## 2021-09-21 RX ORDER — OXYCODONE HYDROCHLORIDE 5 MG/1
5 TABLET ORAL ONCE
Refills: 0 | Status: DISCONTINUED | OUTPATIENT
Start: 2021-09-21 | End: 2021-09-21

## 2021-09-21 RX ORDER — SODIUM CHLORIDE 9 MG/ML
1000 INJECTION, SOLUTION INTRAVENOUS
Refills: 0 | Status: DISCONTINUED | OUTPATIENT
Start: 2021-09-21 | End: 2021-09-21

## 2021-09-21 RX ORDER — AMLODIPINE BESYLATE 2.5 MG/1
0 TABLET ORAL
Qty: 0 | Refills: 0 | DISCHARGE

## 2021-09-21 RX ORDER — CEFAZOLIN SODIUM 1 G
2000 VIAL (EA) INJECTION ONCE
Refills: 0 | Status: COMPLETED | OUTPATIENT
Start: 2021-09-21 | End: 2021-09-21

## 2021-09-21 RX ORDER — ACETAMINOPHEN 500 MG
1000 TABLET ORAL ONCE
Refills: 0 | Status: DISCONTINUED | OUTPATIENT
Start: 2021-09-21 | End: 2021-09-21

## 2021-09-21 RX ORDER — OXYCODONE AND ACETAMINOPHEN 5; 325 MG/1; MG/1
1 TABLET ORAL
Qty: 20 | Refills: 0
Start: 2021-09-21

## 2021-09-21 RX ORDER — HYDROMORPHONE HYDROCHLORIDE 2 MG/ML
0.5 INJECTION INTRAMUSCULAR; INTRAVENOUS; SUBCUTANEOUS
Refills: 0 | Status: DISCONTINUED | OUTPATIENT
Start: 2021-09-21 | End: 2021-09-21

## 2021-09-21 RX ORDER — CEPHALEXIN 500 MG
1 CAPSULE ORAL
Qty: 14 | Refills: 0
Start: 2021-09-21 | End: 2021-09-27

## 2021-09-21 RX ORDER — ONDANSETRON 8 MG/1
4 TABLET, FILM COATED ORAL ONCE
Refills: 0 | Status: COMPLETED | OUTPATIENT
Start: 2021-09-21 | End: 2021-09-21

## 2021-09-21 RX ADMIN — SODIUM CHLORIDE 75 MILLILITER(S): 9 INJECTION, SOLUTION INTRAVENOUS at 15:04

## 2021-09-21 RX ADMIN — ONDANSETRON 4 MILLIGRAM(S): 8 TABLET, FILM COATED ORAL at 14:32

## 2021-09-21 RX ADMIN — HYDROMORPHONE HYDROCHLORIDE 0.5 MILLIGRAM(S): 2 INJECTION INTRAMUSCULAR; INTRAVENOUS; SUBCUTANEOUS at 14:34

## 2021-09-21 RX ADMIN — OXYCODONE HYDROCHLORIDE 5 MILLIGRAM(S): 5 TABLET ORAL at 15:37

## 2021-09-21 RX ADMIN — HYDROMORPHONE HYDROCHLORIDE 0.5 MILLIGRAM(S): 2 INJECTION INTRAMUSCULAR; INTRAVENOUS; SUBCUTANEOUS at 14:54

## 2021-09-21 NOTE — ASU DISCHARGE PLAN (ADULT/PEDIATRIC) - CARE PROVIDER_API CALL
Grazyna Lopez)  Plastic Surgery; Surgery  [address] [facility]  [address] [place] [address]  Phone: [phone]  Fax: [phone]  Follow Up Time:

## 2021-09-21 NOTE — ASU DISCHARGE PLAN (ADULT/PEDIATRIC) - ASU DC SPECIAL INSTRUCTIONSFT
Follow up with Dr. Lopez in her office as discussed  Take narcotic pain medication as needed for severe pain, if taking narcotic pain medication take stool softener to avoid constipation  Take antibiotic as prescribed, complete dose  Do not remove dressing until seen by Dr. Lopez   May sponge bath however do not get breast area wet, keep dressing clean and dry

## 2022-03-16 ENCOUNTER — NON-APPOINTMENT (OUTPATIENT)
Age: 32
End: 2022-03-16

## 2022-03-16 ENCOUNTER — APPOINTMENT (OUTPATIENT)
Dept: CARDIOLOGY | Facility: CLINIC | Age: 32
End: 2022-03-16
Payer: COMMERCIAL

## 2022-03-16 VITALS
DIASTOLIC BLOOD PRESSURE: 80 MMHG | SYSTOLIC BLOOD PRESSURE: 124 MMHG | HEIGHT: 66 IN | WEIGHT: 180 LBS | HEART RATE: 56 BPM | TEMPERATURE: 97.9 F | BODY MASS INDEX: 28.93 KG/M2 | OXYGEN SATURATION: 98 %

## 2022-03-16 PROCEDURE — 99213 OFFICE O/P EST LOW 20 MIN: CPT

## 2022-03-16 PROCEDURE — 93000 ELECTROCARDIOGRAM COMPLETE: CPT

## 2022-03-16 NOTE — CARDIOLOGY SUMMARY
[de-identified] : Sinus  Bradycardia  -Horizontal axis for age.   -Left atrial enlargement.   Voltage criteria for LVH  (R(I)+S(III) exceeds 2.50 mV)  -Voltage criteria w/o ST/T abnormality may be normal.   -Poor R-wave progression -nondiagnostic for this age.   -  Nonspecific T-abnormality.   ABNORMAL

## 2022-03-16 NOTE — DISCUSSION/SUMMARY
[___ Year(s)] : in [unfilled] year(s) [FreeTextEntry1] : Continue amlodipine as needed for blood pressure control.  Follow home blood pressure trends.  No cardiac testing indicated at present.  I renewed her blood pressure medication.  Heart healthy diet and lifestyle encouraged.  Follow-up with you for care and see me in 1 years time or sooner if needed.

## 2022-03-16 NOTE — HISTORY OF PRESENT ILLNESS
[FreeTextEntry1] : Rosanne is a pleasant 31-year-old female follows up in the office without current chest complaints reported eating healthier.  She has borderline hypertension and takes amlodipine intermittently.  She is lost weight.

## 2022-03-17 NOTE — DISCUSSION/SUMMARY
[Procedure Low Risk] : the procedure risk is low [Optimized for Surgery] : the patient is optimized for surgery [As per surgery] : as per surgery [FreeTextEntry1] : She is advised to have amlodipine 2.5 mg once daily if her blood pressure is elevated and she is advised to follow her blood pressure trends with a home monitor if possible.  From a current clinical and cardiac standpoint, she may proceed ahead with her breast reduction surgery at [facility] with routine hemodynamic monitoring perioperatively.  We will pull her recent Holter report and tell her those results when available.  I recommended a heart healthy lifestyle including a low-saturated fat, low cholesterol diet with improved aerobic physical fitness over time for cardiovascular benefits.  Carbohydrate and sodium restriction along with weight loss over time encouraged.  Otherwise see me in 6 months or sooner if needed. Render In Strict Bullet Format?: No Continue Regimen: isotretinoin 30 mg capsule BID \\nQuantity: 60.0 Capsule\\nSig: Take one pill twice daily with food and water. Detail Level: Zone Initiate Treatment: triamcinolone acetonide 0.1 % topical ointment Twice daily\\nSig: Apply to affected areas on body twice daily for 2 weeks then stop for 1 week

## 2022-03-31 ENCOUNTER — RESULT REVIEW (OUTPATIENT)
Age: 32
End: 2022-03-31

## 2022-05-11 ENCOUNTER — NON-APPOINTMENT (OUTPATIENT)
Age: 32
End: 2022-05-11

## 2022-05-20 ENCOUNTER — NON-APPOINTMENT (OUTPATIENT)
Age: 32
End: 2022-05-20

## 2022-06-14 NOTE — ASU DISCHARGE PLAN (ADULT/PEDIATRIC) - PAIN MANAGEMENT
Pt tolerating small sips of ice water Prescriptions electronically submitted to pharmacy from [facility]

## 2022-09-07 ENCOUNTER — NON-APPOINTMENT (OUTPATIENT)
Age: 32
End: 2022-09-07

## 2022-09-08 NOTE — H&P PST ADULT - FUNCTIONAL SCREEN CURRENT LEVEL: EATING, MLM
[facility] Medicine Service Consult Note.     Physician requesting consult: Belkis Zhao MD  Thank you, Belkis Contreras MD, for asking the [facility] Medicine Service to see Ayde Hoskins in consultation.    Assessment/Recommendations   Assessment/Plan: 30-year-old female G2 para 1, gestation at 36 weeks and 6 days, history of vasovagal syncope associated with previous pregnancy resolved after vaginal delivery of child, complaining of nausea and vomiting associated with positional vertigo symptoms.  Suspect benign paroxysmal positional vertigo.  We will rule out orthostatic hypotension and urinary tract infection.    Likely benign paroxysmal positional vertigo  Would recommend meclizine 25 mg p.o. 3 times daily.  Recommend PT vestibular rehab during hospitalization.  Continue antiemetics and Pepcid as you are doing.    Intrauterine pregnancy  Bilateral flank tenderness.   Abnormal Urinalysis.   Possible pyelonephritis.   Ultrasound OB with normal biophysical profile and viable intrauterine pregnancy.  UA with pyuria and few bacteria.   Patient with flank tenderness, nausea, vomiting.  Treat as possible acute pyelonephritis.   Order Ceftriaxone 1 g IV q24h.   Change IV fluids to D5 NS infusion given initiation of ceftriaxone.     Normocytic anemia  Defer to OB Gyn.   Continue iron supplementation.    Associated with positional  Code status:No Order  -Reviewed the patient's preoperative H and P and updated missing elements.  -Home medication reconciliation has been reviewed.      History of Present Illness/Subjective    HPI: Ms. Ayde Hoskins is a 30 year old female status post   Post-operative Day:  hospital medicine was consulted for nausea, vomiting, and dizziness.    Onset of symptoms were approximately 4 weeks ago.  Her predominant symptoms were sensation of the room spinning with associated nausea and vomiting.  Her dizziness and lightheadedness seem to worsen  when she was up moving around.  She feels similarly to her previous pregnancy where late in her pregnancy she began having episodes of syncope.  She has been more careful during this pregnancy to sit down or lay down when feelings of dizziness occur.  She denies any recent episodes of syncope or loss of consciousness.  Denies head trauma.  She denies tinnitus.  No headache, visual field changes, aphasia, ataxia, ear pain, or hearing loss.  She denies any melena, hematochezia or hematemesis.  She has noticed some abdominal cramping associated with her pregnancy.  She has had episodes of dysuria and bilateral flank tenderness.  She reports a urinalysis a few weeks ago was unremarkable according to her gynecologist.  She denies any recent recreational drug use, alcohol use.  She denies any recreational drug use, alcohol use, medical procedures, change in home medication.    Patient denies cerebrovascular accident, myocardial infarction, pulmonary embolism, or deep venous thrombosis.     Estimated Blood Loss:  * No surgery found *    I have reviewed preop physical including past medical hx, family hx, social hx, surgical hx, medication hx.      Physical Examination  Review of Systems   VITALS: /60   LMP 12/24/2021   BMI: There is no height or weight on file to calculate BMI.  Wt Readings from Last 3 Encounters:   No data found for Wt     No intake or output data in the 24 hours ending 09/08/22 1635  General Appearance:   no acute distress.   ENT/Mouth: membranes moist, no oral lesions or bleeding gums.      EYES:  no scleral icterus, normal conjunctivae, right-sided horizontal nystagmus which is fatigable.   Neck: no carotid bruits or thyromegaly   Chest/Lungs:   lungs are clear to auscultation, no rales or wheezing, equal chest wall expansion    Cardiovascular:   Regular. Normal S1/S2 heart sounds with no murmurs, rubs, or gallops.   Abdomen:  no organomegaly, masses, bruits, or tenderness; bowel sounds are  present   Extremities: no cyanosis or clubbing   Skin: no xanthelasma, warm.    Neurologic: normal  bilateral, no tremors     Psychiatric: alert and oriented x3, calm     A 12 point comprehensive review of systems was negative except as noted above.         Medical History  Surgical History Family History Social History   Patient Active Problem List    Diagnosis Date Noted     Encounter for triage in pregnant patient 09/08/2022     Priority: Medium     Pregnant 02/25/2021     Priority: Medium    No past surgical history on file.  Reviewed, and family history is not on file.     Reviewed, and she  reports that she has never smoked. She has never used smokeless tobacco. She reports previous alcohol use. She reports that she does not use drugs.  Social History     Tobacco Use     Smoking status: Never Smoker     Smokeless tobacco: Never Used   Substance Use Topics     Alcohol use: Not Currently        Medications  Allergies   Medications Prior to Admission   Medication Sig Dispense Refill Last Dose     ferrous sulfate 325 (65 FE) MG tablet [FERROUS SULFATE 325 (65 FE) MG TABLET] Take 1 tablet by mouth daily with breakfast.        prenatal no115-iron-folic acid 29 mg iron- 1 mg Chew [PRENATAL -IRON-FOLIC ACID 29 MG IRON- 1 MG CHEW] Chew daily.          Allergies   Allergen Reactions     Bee Venom Protein (Honey Bee) [Bee Venom] Anaphylaxis         Imaging Reviewed Personally By Myself    Radiology Results:   Recent Results (from the past 24 hour(s))   US OB Biophys Single Gestation Measure    Narrative    EXAM: US OB BIOPHYS SINGLE GESTATION W MEASURE  LOCATION: [facility]  DATE/TIME: 9/8/2022 3:57 PM    INDICATION: Vaginal bleeding 36 weeks.  COMPARISON: 7/5/2022.    FINDINGS:  Single living fetus, vertex presentation.    HEART RATE: 129 bpm.  SDP 5.7 cm.  PLACENTA: Anterior. No previa. No retroplacental or subchorionic fluid collections.  CERVIX: Obscured.    2/2 fetal breathing  2/2  fetal movements  2/2 fetal tone  2/2 amniotic fluid     Total biophysical profile 8/8    BIOMETRY:  Biparietal Diameter: 8.7 cm, 35 weeks 1 day.  Head Circumference: 32.8 cm, 37 weeks 3 days.  Abdominal Circumference: 31.5 cm, 35 weeks 4 days.  Femur Length: 7.0 cm, 36 weeks 1 day.    Estimated Fetal Weight: 2770 g.  EFW Percentile: 28 percent.    EDC by This US exam: 10/5/2022.  Composite Age by This US: 36 weeks 1 day.      Impression    IMPRESSION:  1.  Normal 8/8 biophysical profile.  2.  Single living intrauterine gestation.  3.  Based on prior dating, composite age of 36 weeks 6 days with EDC 9/30/2022.  4.  Normal interval growth.       Labs Reviewed Personally By Myself     Most Recent 3 CBC's:Recent Labs   Lab Test 09/08/22  1242 02/26/21  1345   WBC 10.3 15.2*   HGB 10.1* 10.9*   MCV 90 91    253     Most Recent 3 BMP's:Recent Labs   Lab Test 09/08/22  1242 02/26/21  1345   *  --    POTASSIUM 3.7  --    CHLORIDE 107  --    CO2 21*  --    BUN 8  --    CR 0.59* 0.59*   ANIONGAP 7  --    OSCAR 8.4*  --    GLC 70  --      Most Recent 2 LFT's:Recent Labs   Lab Test 09/08/22  1242 02/26/21  1345   AST 13 13   ALT <9 11   ALKPHOS 109  --    BILITOTAL 0.4  --      Thank you for this consultation.  Appreciate the opportunity to participate in the care of Ayde Hoskins, please feel free to contact us for any questions or concerns.    Mamadou Corrales DO, MS  [facility][1] Service  Internal Medicine  Phone: #[phone]     0 = independent

## 2022-09-12 ENCOUNTER — APPOINTMENT (OUTPATIENT)
Dept: DERMATOLOGY | Facility: CLINIC | Age: 32
End: 2022-09-12

## 2022-09-18 ENCOUNTER — NON-APPOINTMENT (OUTPATIENT)
Age: 32
End: 2022-09-18

## 2023-04-03 ENCOUNTER — NON-APPOINTMENT (OUTPATIENT)
Age: 33
End: 2023-04-03

## 2023-05-11 ENCOUNTER — NON-APPOINTMENT (OUTPATIENT)
Age: 33
End: 2023-05-11

## 2023-06-14 ENCOUNTER — NON-APPOINTMENT (OUTPATIENT)
Age: 33
End: 2023-06-14

## 2023-06-14 ENCOUNTER — APPOINTMENT (OUTPATIENT)
Dept: CARDIOLOGY | Facility: CLINIC | Age: 33
End: 2023-06-14
Payer: COMMERCIAL

## 2023-06-14 VITALS
OXYGEN SATURATION: 98 % | WEIGHT: 173 LBS | SYSTOLIC BLOOD PRESSURE: 128 MMHG | BODY MASS INDEX: 27.8 KG/M2 | HEIGHT: 66 IN | DIASTOLIC BLOOD PRESSURE: 70 MMHG | HEART RATE: 53 BPM

## 2023-06-14 PROCEDURE — 99214 OFFICE O/P EST MOD 30 MIN: CPT

## 2023-06-14 PROCEDURE — 93000 ELECTROCARDIOGRAM COMPLETE: CPT

## 2023-06-14 NOTE — CARDIOLOGY SUMMARY
[de-identified] : Sinus  Rhythm  -Horizontal axis for age.   Voltage criteria for LVH  (R(I)+S(III) exceeds 2.50 mV)  -Voltage criteria w/o ST/T abnormality may be normal.   -  Diffuse nonspecific T-abnormality.   ABNORMAL

## 2023-06-14 NOTE — ASSESSMENT
[FreeTextEntry1] : We will change her amlodipine to Procardia ER 30 mg tablets half a tablet in the morning and half in the evening or as directed based on the following home blood pressure trends.  Prescription for home blood pressure arm device given to monitor blood pressure.  I have ordered an echocardiogram to assess LV function and valvular status.  We may have to uptitrate her nifedipine dose depending on her blood pressure response.  Given the self-reported thyroid nodule picked up on orthopedic imaging work-up and MRI, she is asking for an endocrinology referral has taken the liberty of giving her for Dr. Reddy.  Follow-up in about 3 months or sooner if needed.

## 2023-06-14 NOTE — DISCUSSION/SUMMARY
Lm for pt to callback [___ Week(s)] : in [unfilled] week(s) [FreeTextEntry3] : Echo; 3-month follow-up [FreeTextEntry1] : Continue amlodipine as needed for blood pressure control.  Follow home blood pressure trends.  No cardiac testing indicated at present.  I renewed her blood pressure medication.  Heart healthy diet and lifestyle encouraged.  Follow-up with you for care and see me in 1 years time or sooner if needed.

## 2023-06-14 NOTE — HISTORY OF PRESENT ILLNESS
[FreeTextEntry1] : Rosanne is a pleasant 32-year-old with BMI 27, who has hypertension history and comes in for cardiac checkup.  She notes she is trying to become pregnant and would like to change her antihypertensive regimen to something more conducive for pregnancy no obvious chest complaints.  He does have an abnormal ECG suggestive of hypertensive heart disease. She is eating healthier and has lost some weight.

## 2023-06-28 NOTE — ASU DISCHARGE PLAN (ADULT/PEDIATRIC) - ACCOMPANIED BY
[Disease: _____________________] : Disease: [unfilled] [T: ___] : T[unfilled] [N: ___] : N[unfilled] [M: ___] : M[unfilled] [AJCC Stage: ____] : AJCC Stage: [unfilled] [Therapy: ___] : Therapy: [unfilled] [Cycle: ___] : Cycle: [unfilled] [Day: ___] : Day: [unfilled] [de-identified] : 58 M with pmhx HLD, HTN, CAD (s/p DESx1 in 2011 and DESx3 in 2021) diagnosed with pancreas cancer (T3bN2), s/p Whipple procedure 10/6/21. \par \par Patient was initially hospitalized from 8/30/2021- 9/2/2021 at [facility] due to worsening RUQ pain x 2 weeks; additional work-up revealed elevated bilirubin and liver enzymes. Further imaging modalities were performed; CT A/P from 8/30/21 showed slight heterogeneous enhancement of the pancreatic head. MRCP from 8/31/21 demonstrated delayed enhancing soft tissue. EGD/EUS was completed on 9/7/21 and it showed a duodenal mass invading into the pancreatic head; biopsy confirmed with pancreatic head FNA - Positive for malignant cells (adenocarcinoma). \par \par Patient was referred to Dr. Trent Pro (surgery) and he underwent Whipple procedure on 10/6/21. Pathology showed invasive adenocarcinoma (2.7 cm and 5 cm) with metastatic carcinoma involving 7/31 regional lymph nodes (T3bN2, negative margins).\par \par Advised to follow up with medical oncology for adjuvant therapy. \par \par 11/2/21: CT CAP: neg for malignancy, nonspecific small RP nodes \par 11/24/21-5/4/22: C1- 12 mFOLFIRNOX \par 1/5/22: tx held due to \par 2/16/22: CT CAP: inflammatory changes in the bile duct, mesenteric LN 8 mm likely reactive, 2 cm renal mass, MRI recommended \par 3/18/22: MRI Abd: 2 cm R lower pole renal pass suspicious for RCC\par 5/23/22: CT CAP: slight interval incr in mesenteric LN which are nonspecific, nonspecific stranding surrounding the mesenteric root of small bowel \par 7/30/22: CT CAP: unchanged post surgical changes? resolved LN\par 12/4/22: CT CAP: stable \par 3/1/23: CT CAP: new ill defined fat stranding in fany hepatis severe narrowing of portal vein, rasing the possibility of recurrent disease\par 3/21/23: PET/CT: FGD avid focus in region of pancreatic surgical bed with severe narrowing of the portal vein suspicious for recurrent disease, indeterminant focus adjacent to the distal stomach, adjacent to the jejunostomy. \par 3/28/23: Reviewed case at , consensus that this is recurrent disease. Bx is difficult. Treat without a bx. \par 4/5/23-5/3/23: C1-2 [place]/Abraxane \par 4/18/23: EUS: bx results/FNA negative for malignancy. \par 4/19/23: C1D15\par 5/3/23-5/17/23: C2D1 & D15\par 5/31/23-6/14/23: C3D1 & D15\par 6/21/23: CT CAP: persistent perivascular infiltration in the fany hepatis and surgical bed with marked narrowing of the main portal vein, as described above. Wall thickening and hyperenhancement of the extrahepatic bile duct at the choledochojejunostomy persists. New segmental hyperenhancement within the left hepatic lobe of unknown etiology, no discrete liver mass is evident. \par 6/28/23: C4D1 [de-identified] : moderately differentiated adenocarcinoma [de-identified] : Patient here to continue treatment. Tolerating well. Fatigue is stable. Performing all ADLs independently. Appetite and bowels are normal. He is without pain. No change in night sweats. Main complaints today is lipoma on his head he would like removed.  Parents

## 2023-06-29 ENCOUNTER — RX CHANGE (OUTPATIENT)
Age: 33
End: 2023-06-29

## 2023-07-05 ENCOUNTER — APPOINTMENT (OUTPATIENT)
Dept: CARDIOLOGY | Facility: CLINIC | Age: 33
End: 2023-07-05
Payer: COMMERCIAL

## 2023-07-05 PROCEDURE — 93306 TTE W/DOPPLER COMPLETE: CPT

## 2023-08-12 ENCOUNTER — NON-APPOINTMENT (OUTPATIENT)
Age: 33
End: 2023-08-12

## 2023-08-14 ENCOUNTER — NON-APPOINTMENT (OUTPATIENT)
Age: 33
End: 2023-08-14

## 2023-08-19 ENCOUNTER — EMERGENCY (EMERGENCY)
Facility: HOSPITAL | Age: 33
LOS: 1 days | Discharge: HOME | End: 2023-08-19
Admitting: EMERGENCY MEDICINE
Payer: COMMERCIAL

## 2023-08-19 VITALS
SYSTOLIC BLOOD PRESSURE: 150 MMHG | TEMPERATURE: 98 F | RESPIRATION RATE: 18 BRPM | OXYGEN SATURATION: 100 % | DIASTOLIC BLOOD PRESSURE: 83 MMHG | HEART RATE: 67 BPM

## 2023-08-19 PROCEDURE — 99284 EMERGENCY DEPT VISIT MOD MDM: CPT

## 2023-08-19 RX ORDER — DIAZEPAM 5 MG
1 TABLET ORAL
Qty: 9 | Refills: 0
Start: 2023-08-19 | End: 2023-08-21

## 2023-08-19 RX ORDER — DIAZEPAM 5 MG
5 TABLET ORAL ONCE
Refills: 0 | Status: DISCONTINUED | OUTPATIENT
Start: 2023-08-19 | End: 2023-08-19

## 2023-08-19 RX ORDER — KETOROLAC TROMETHAMINE 30 MG/ML
30 SYRINGE (ML) INJECTION ONCE
Refills: 0 | Status: DISCONTINUED | OUTPATIENT
Start: 2023-08-19 | End: 2023-08-19

## 2023-08-19 RX ORDER — IBUPROFEN 200 MG
1 TABLET ORAL
Qty: 40 | Refills: 0
Start: 2023-08-19 | End: 2023-08-28

## 2023-08-19 RX ORDER — LIDOCAINE 4 G/100G
1 CREAM TOPICAL ONCE
Refills: 0 | Status: COMPLETED | OUTPATIENT
Start: 2023-08-19 | End: 2023-08-19

## 2023-08-19 RX ADMIN — LIDOCAINE 1 PATCH: 4 CREAM TOPICAL at 03:58

## 2023-08-19 RX ADMIN — Medication 30 MILLIGRAM(S): at 04:00

## 2023-08-19 RX ADMIN — Medication 5 MILLIGRAM(S): at 03:58
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
## 2023-08-19 NOTE — ED PROVIDER NOTE - NSFOLLOWUPINSTRUCTIONS_ED_ALL_ED_FT
Take Valium 5mg every 8hrs as needed for muscle spasm- caution drowsiness when taking this medication. Do not drive or operate heavy machinery.    You can also take Ibuprofen 600mg every 6 hours for pain control.    Follow up with your PMD within 48-72 hrs. Show copies of your reports given to you. Take all of your medications as previously prescribed.    If you have any new, worsened or concerning symptoms, please return to the emergency department immediately.
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
## 2023-08-19 NOTE — ED ADULT TRIAGE NOTE - ESI TRIAGE ACUITY LEVEL, MLM
Patient's first and last name, , procedure, and correct site confirmed prior to the start of procedure.
4

## 2023-08-19 NOTE — ED PROVIDER NOTE - PATIENT PORTAL LINK FT
You can access the FollowMyHealth Patient Portal offered by [facility] by registering at the following website: [email or web address]. By joining [facility]’s FollowMyHealth portal, you will also be able to view your health information using other applications (apps) compatible with our system.

## 2023-10-21 ENCOUNTER — NON-APPOINTMENT (OUTPATIENT)
Age: 33
End: 2023-10-21

## 2023-12-01 ENCOUNTER — APPOINTMENT (OUTPATIENT)
Dept: CARDIOLOGY | Facility: CLINIC | Age: 33
End: 2023-12-01

## 2023-12-08 ENCOUNTER — APPOINTMENT (OUTPATIENT)
Dept: CARDIOLOGY | Facility: CLINIC | Age: 33
End: 2023-12-08
Payer: COMMERCIAL

## 2023-12-08 VITALS
DIASTOLIC BLOOD PRESSURE: 72 MMHG | BODY MASS INDEX: 26.84 KG/M2 | HEIGHT: 66 IN | HEART RATE: 67 BPM | SYSTOLIC BLOOD PRESSURE: 128 MMHG | WEIGHT: 167 LBS | OXYGEN SATURATION: 99 %

## 2023-12-08 DIAGNOSIS — R94.31 ABNORMAL ELECTROCARDIOGRAM [ECG] [EKG]: ICD-10-CM

## 2023-12-08 PROCEDURE — 99213 OFFICE O/P EST LOW 20 MIN: CPT

## 2023-12-08 RX ORDER — AMLODIPINE BESYLATE 2.5 MG/1
2.5 TABLET ORAL
Qty: 60 | Refills: 3 | Status: DISCONTINUED | COMMUNITY
Start: 2021-04-08 | End: 2023-12-08

## 2024-02-15 ENCOUNTER — ASOB RESULT (OUTPATIENT)
Age: 34
End: 2024-02-15

## 2024-02-15 ENCOUNTER — APPOINTMENT (OUTPATIENT)
Dept: OBGYN | Facility: CLINIC | Age: 34
End: 2024-02-15
Payer: COMMERCIAL

## 2024-02-15 PROCEDURE — 76817 TRANSVAGINAL US OBSTETRIC: CPT

## 2024-03-07 ENCOUNTER — NON-APPOINTMENT (OUTPATIENT)
Age: 34
End: 2024-03-07

## 2024-03-08 ENCOUNTER — NON-APPOINTMENT (OUTPATIENT)
Age: 34
End: 2024-03-08

## 2024-03-08 ENCOUNTER — APPOINTMENT (OUTPATIENT)
Dept: INTERNAL MEDICINE | Facility: CLINIC | Age: 34
End: 2024-03-08
Payer: COMMERCIAL

## 2024-03-08 VITALS
SYSTOLIC BLOOD PRESSURE: 123 MMHG | HEART RATE: 43 BPM | OXYGEN SATURATION: 99 % | WEIGHT: 165 LBS | DIASTOLIC BLOOD PRESSURE: 90 MMHG | BODY MASS INDEX: 26.52 KG/M2 | HEIGHT: 66 IN

## 2024-03-08 DIAGNOSIS — Z13.0 ENCOUNTER FOR SCREENING FOR DISEASES OF THE BLOOD AND BLOOD-FORMING ORGANS AND CERTAIN DISORDERS INVOLVING THE IMMUNE MECHANISM: ICD-10-CM

## 2024-03-08 DIAGNOSIS — L30.9 DERMATITIS, UNSPECIFIED: ICD-10-CM

## 2024-03-08 DIAGNOSIS — Z00.00 ENCOUNTER FOR GENERAL ADULT MEDICAL EXAMINATION W/OUT ABNORMAL FINDINGS: ICD-10-CM

## 2024-03-08 DIAGNOSIS — Z13.1 ENCOUNTER FOR SCREENING FOR DIABETES MELLITUS: ICD-10-CM

## 2024-03-08 DIAGNOSIS — Z13.220 ENCOUNTER FOR SCREENING FOR LIPOID DISORDERS: ICD-10-CM

## 2024-03-08 DIAGNOSIS — E28.2 POLYCYSTIC OVARIAN SYNDROME: ICD-10-CM

## 2024-03-08 DIAGNOSIS — D57.3 SICKLE-CELL TRAIT: ICD-10-CM

## 2024-03-08 DIAGNOSIS — E04.1 NONTOXIC SINGLE THYROID NODULE: ICD-10-CM

## 2024-03-08 DIAGNOSIS — Z13.29 ENCOUNTER FOR SCREENING FOR OTHER SUSPECTED ENDOCRINE DISORDER: ICD-10-CM

## 2024-03-08 DIAGNOSIS — Z11.3 ENCOUNTER FOR SCREENING FOR INFECTIONS WITH A PREDOMINANTLY SEXUAL MODE OF TRANSMISSION: ICD-10-CM

## 2024-03-08 PROCEDURE — 99385 PREV VISIT NEW AGE 18-39: CPT | Mod: 25

## 2024-03-08 PROCEDURE — 36415 COLL VENOUS BLD VENIPUNCTURE: CPT

## 2024-03-08 PROCEDURE — 93000 ELECTROCARDIOGRAM COMPLETE: CPT | Mod: 59

## 2024-03-08 RX ORDER — TRIAMCINOLONE ACETONIDE 1 MG/G
0.1 CREAM TOPICAL
Qty: 1 | Refills: 3 | Status: ACTIVE | COMMUNITY
Start: 2024-03-08 | End: 1900-01-01

## 2024-03-08 NOTE — PLAN
[FreeTextEntry1] : Miscarriage Her gynecologist has ordered blood work for her which she will have done next week  Hypertension Blood pressure is controlled on nifedipine 30 mg.  Sickle cell trait She and her partner may want to consider having a hemoglobin electrophoresis on him   Health maintenance Depression screen negative Check labs today, including CBC, CMP, TSH, HbA1c, lipids, HIV. Blood collected in office.  Pap smear due in April Follow-up 1 year or as needed

## 2024-03-08 NOTE — REVIEW OF SYSTEMS
[Negative] : Heme/Lymph [Chills] : no chills [Fever] : no fever [FreeTextEntry2] : as noted in HPI  [FreeTextEntry8] : as noted in HPI

## 2024-03-08 NOTE — PHYSICAL EXAM
[Well Nourished] : well nourished [No Acute Distress] : no acute distress [Well Developed] : well developed [Well-Appearing] : well-appearing [Normal Sclera/Conjunctiva] : normal sclera/conjunctiva [PERRL] : pupils equal round and reactive to light [EOMI] : extraocular movements intact [Normal Outer Ear/Nose] : the outer ears and nose were normal in appearance [Normal Oropharynx] : the oropharynx was normal [Normal TMs] : both tympanic membranes were normal [No JVD] : no jugular venous distention [No Lymphadenopathy] : no lymphadenopathy [Thyroid Normal, No Nodules] : the thyroid was normal and there were no nodules present [Supple] : supple [No Accessory Muscle Use] : no accessory muscle use [No Respiratory Distress] : no respiratory distress  [Normal Rate] : normal rate  [Clear to Auscultation] : lungs were clear to auscultation bilaterally [Regular Rhythm] : with a regular rhythm [Normal S1, S2] : normal S1 and S2 [No Murmur] : no murmur heard [No Carotid Bruits] : no carotid bruits [No Varicosities] : no varicosities [Pedal Pulses Present] : the pedal pulses are present [No Edema] : there was no peripheral edema [No Extremity Clubbing/Cyanosis] : no extremity clubbing/cyanosis [Normal Appearance] : normal in appearance [No Nipple Discharge] : no nipple discharge [No Axillary Lymphadenopathy] : no axillary lymphadenopathy [Soft] : abdomen soft [Non Tender] : non-tender [Non-distended] : non-distended [No Masses] : no abdominal mass palpated [Normal Bowel Sounds] : normal bowel sounds [No HSM] : no HSM [Normal Anterior Cervical Nodes] : no anterior cervical lymphadenopathy [Normal Posterior Cervical Nodes] : no posterior cervical lymphadenopathy [No CVA Tenderness] : no CVA  tenderness [No Spinal Tenderness] : no spinal tenderness [No Joint Swelling] : no joint swelling [Grossly Normal Strength/Tone] : grossly normal strength/tone [Coordination Grossly Intact] : coordination grossly intact [No Rash] : no rash [Normal Gait] : normal gait [No Focal Deficits] : no focal deficits [Alert and Oriented x3] : oriented to person, place, and time [Deep Tendon Reflexes (DTR)] : deep tendon reflexes were 2+ and symmetric [Normal Affect] : the affect was normal [Normal Insight/Judgement] : insight and judgment were intact

## 2024-03-08 NOTE — HEALTH RISK ASSESSMENT
[0] : 2) Feeling down, depressed, or hopeless: Not at all (0) [PHQ-2 Negative - No further assessment needed] : PHQ-2 Negative - No further assessment needed [Patient reported PAP Smear was normal] : Patient reported PAP Smear was normal [Significant Other] : lives with significant other [# Of Children ___] : has [unfilled] children [Never] : Never [de-identified] : social [No falls in past year] : Patient reported no falls in the past year [[id number]] : 0 [Reports changes in hearing] : Reports no changes in hearing [Reports changes in vision] : Reports no changes in vision [PapSmearDate] : 04/23 [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

## 2024-03-08 NOTE — HISTORY OF PRESENT ILLNESS
[FreeTextEntry1] : Annual physical [de-identified] : 33-year-old female with history of sickle cell trait, PCOS, hypertension and bradycardia who presents for an initial visit/annual physical. She sees cardiology Has lost about 20 pounds over the past year. Less eating out. More home cooked meals Had a recent miscarriage.  Saw her gynecologist yesterday for follow-up no exercise. active at work pap 4/23

## 2024-03-14 ENCOUNTER — TRANSCRIPTION ENCOUNTER (OUTPATIENT)
Age: 34
End: 2024-03-14

## 2024-03-14 LAB
ALBUMIN SERPL ELPH-MCNC: 4.9 G/DL
ALP BLD-CCNC: 54 U/L
ALT SERPL-CCNC: 14 U/L
ANION GAP SERPL CALC-SCNC: 11 MMOL/L
APPEARANCE: CLEAR
AST SERPL-CCNC: 16 U/L
BACTERIA: ABNORMAL /HPF
BASOPHILS # BLD AUTO: 0.03 K/UL
BASOPHILS NFR BLD AUTO: 0.8 %
BILIRUB SERPL-MCNC: 0.3 MG/DL
BILIRUBIN URINE: NEGATIVE
BLOOD URINE: NEGATIVE
BUN SERPL-MCNC: 11 MG/DL
CALCIUM SERPL-MCNC: 9.6 MG/DL
CAST: 0 /LPF
CHLORIDE SERPL-SCNC: 103 MMOL/L
CHOLEST SERPL-MCNC: 189 MG/DL
CO2 SERPL-SCNC: 25 MMOL/L
COLOR: YELLOW
CREAT SERPL-MCNC: 0.79 MG/DL
EGFR: 100 ML/MIN/1.73M2
EOSINOPHIL # BLD AUTO: 0.09 K/UL
EOSINOPHIL NFR BLD AUTO: 2.4 %
EPITHELIAL CELLS: 21 /HPF
ESTIMATED AVERAGE GLUCOSE: 108 MG/DL
GLUCOSE QUALITATIVE U: NEGATIVE MG/DL
GLUCOSE SERPL-MCNC: 98 MG/DL
HBA1C MFR BLD HPLC: 5.4 %
HCT VFR BLD CALC: 39 %
HDLC SERPL-MCNC: 65 MG/DL
HGB BLD-MCNC: 12.7 G/DL
HIV1+2 AB SPEC QL IA.RAPID: NONREACTIVE
IMM GRANULOCYTES NFR BLD AUTO: 0.3 %
KETONES URINE: NEGATIVE MG/DL
LDLC SERPL CALC-MCNC: 113 MG/DL
LEUKOCYTE ESTERASE URINE: NEGATIVE
LYMPHOCYTES # BLD AUTO: 2.06 K/UL
LYMPHOCYTES NFR BLD AUTO: 55.8 %
MAN DIFF?: NORMAL
MCHC RBC-ENTMCNC: 26.6 PG
MCHC RBC-ENTMCNC: 32.6 GM/DL
MCV RBC AUTO: 81.8 FL
MICROSCOPIC-UA: NORMAL
MONOCYTES # BLD AUTO: 0.27 K/UL
MONOCYTES NFR BLD AUTO: 7.3 %
NEUTROPHILS # BLD AUTO: 1.23 K/UL
NEUTROPHILS NFR BLD AUTO: 33.4 %
NITRITE URINE: NEGATIVE
NONHDLC SERPL-MCNC: 123 MG/DL
PH URINE: 5.5
PLATELET # BLD AUTO: 333 K/UL
POTASSIUM SERPL-SCNC: 4.7 MMOL/L
PROT SERPL-MCNC: 7.5 G/DL
PROTEIN URINE: NORMAL MG/DL
RBC # BLD: 4.77 M/UL
RBC # FLD: 14.6 %
RED BLOOD CELLS URINE: 4 /HPF
SODIUM SERPL-SCNC: 139 MMOL/L
SPECIFIC GRAVITY URINE: 1.02
TRIGL SERPL-MCNC: 53 MG/DL
TSH SERPL-ACNC: 2.1 UIU/ML
UROBILINOGEN URINE: 1 MG/DL
WBC # FLD AUTO: 3.69 K/UL
WHITE BLOOD CELLS URINE: 2 /HPF

## 2024-03-19 ENCOUNTER — TRANSCRIPTION ENCOUNTER (OUTPATIENT)
Age: 34
End: 2024-03-19

## 2024-04-09 ENCOUNTER — APPOINTMENT (OUTPATIENT)
Dept: CARDIOLOGY | Facility: CLINIC | Age: 34
End: 2024-04-09
Payer: COMMERCIAL

## 2024-04-09 ENCOUNTER — NON-APPOINTMENT (OUTPATIENT)
Age: 34
End: 2024-04-09

## 2024-04-09 VITALS
OXYGEN SATURATION: 98 % | HEIGHT: 66 IN | BODY MASS INDEX: 26.03 KG/M2 | SYSTOLIC BLOOD PRESSURE: 128 MMHG | HEART RATE: 52 BPM | WEIGHT: 162 LBS | DIASTOLIC BLOOD PRESSURE: 70 MMHG

## 2024-04-09 PROCEDURE — 93000 ELECTROCARDIOGRAM COMPLETE: CPT

## 2024-04-09 PROCEDURE — 99214 OFFICE O/P EST MOD 30 MIN: CPT

## 2024-04-09 PROCEDURE — G2211 COMPLEX E/M VISIT ADD ON: CPT

## 2024-04-09 RX ORDER — NIFEDIPINE 60 MG/1
60 TABLET, EXTENDED RELEASE ORAL DAILY
Qty: 90 | Refills: 2 | Status: ACTIVE | COMMUNITY
Start: 2023-06-14 | End: 1900-01-01

## 2024-04-09 NOTE — CARDIOLOGY SUMMARY
[de-identified] :  6/14/23: Sinus  Rhythm  -Horizontal axis for age.   Voltage criteria for LVH  (R(I)+S(III) exceeds 2.50 mV)  -Voltage criteria w/o ST/T abnormality may be normal.   -  Diffuse nonspecific T-abnormality.   ABNORMAL   [de-identified] : 7/5/2023: LVEF 63% Trace TR, [place]

## 2024-04-09 NOTE — DISCUSSION/SUMMARY
[EKG obtained to assist in diagnosis and management of assessed problem(s)] : EKG obtained to assist in diagnosis and management of assessed problem(s) [FreeTextEntry1] : Continue nifedipine ER for blood pressure control.  Follow home blood pressure trends.  No cardiac testing indicated at present.   Heart healthy diet and lifestyle encouraged. 1. HTN: Increased nifedipine ER to 60mg daily, follow up in a month for BP, must provide BP log during next visit

## 2024-04-09 NOTE — HISTORY OF PRESENT ILLNESS
[FreeTextEntry1] : Rosanne is a pleasant 33-year-old with BMI 26, who has hypertension history and comes in for cardiac checkup.  She notes she is trying to become pregnant and would like to change her antihypertensive regimen to something more conducive for pregnancy no obvious chest complaints.  He does have an abnormal ECG suggestive of hypertensive heart disease. She is eating healthier and has lost some weight.  12/8/2023: Patient is a pleasant 33-year-old female who presents for a general cardiac follow-up. Patient denies CP, SOB, Palpitations, Syncope, Lightheadedness. Patient is up-to-date with cardiac work up with only minor irregularities. Patient is compliant with diet and exercise.   04/09/2024 Pt is here today for BP f/u Pt noted that her bp monitor at home has been elevated for the past couple of days  At home bp reading 150's/110's no chest pain, sob, headeaches, palpitations also noted for tachycardia with the at home bp machine  Pt states he recently got over a cold and was taking mucinex, was experiencing chills, sore throat, cough, congestion and headaches, has been feeling better today.  States she doesn't drink enough fluids as she should, generally eats only 2 meals a day  Non- sedimentary lifestyle at work and at home, but does not exercise   at [facility] Planning on conceiving in the near future

## 2024-05-15 ENCOUNTER — APPOINTMENT (OUTPATIENT)
Dept: CARDIOLOGY | Facility: CLINIC | Age: 34
End: 2024-05-15
Payer: COMMERCIAL

## 2024-05-15 DIAGNOSIS — I10 ESSENTIAL (PRIMARY) HYPERTENSION: ICD-10-CM

## 2024-05-15 PROCEDURE — 99213 OFFICE O/P EST LOW 20 MIN: CPT

## 2024-05-15 NOTE — PHYSICAL EXAM
[Well Developed] : well developed [Well Nourished] : well nourished [No Acute Distress] : no acute distress [Normal S1, S2] : normal S1, S2 [No Murmur] : no murmur [No Rub] : no rub [No Gallop] : no gallop [Clear Lung Fields] : clear lung fields [Good Air Entry] : good air entry [No Respiratory Distress] : no respiratory distress  [Normal Gait] : normal gait [No Edema] : no edema [No Cyanosis] : no cyanosis [No Clubbing] : no clubbing [No Varicosities] : no varicosities [Moves all extremities] : moves all extremities [No Focal Deficits] : no focal deficits [Normal Speech] : normal speech [Alert and Oriented] : alert and oriented [Normal memory] : normal memory

## 2024-05-15 NOTE — HISTORY OF PRESENT ILLNESS
[FreeTextEntry1] : Rosanne is a pleasant 33-year-old with BMI 26, who has hypertension history and comes in for cardiac checkup.  She notes she is trying to become pregnant and would like to change her antihypertensive regimen to something more conducive for pregnancy no obvious chest complaints.  He does have an abnormal ECG suggestive of hypertensive heart disease. She is eating healthier and has lost some weight.  12/8/2023: Patient is a pleasant 33-year-old female who presents for a general cardiac follow-up. Patient denies CP, SOB, Palpitations, Syncope, Lightheadedness. Patient is up-to-date with cardiac work up with only minor irregularities. Patient is compliant with diet and exercise.   04/09/2024 Pt is here today for BP f/u Pt noted that her bp monitor at home has been elevated for the past couple of days  At home bp reading 150's/110's no chest pain, sob, headaches, palpitations also noted for tachycardia with the at home bp machine  Pt states he recently got over a cold and was taking mucinex, was experiencing chills, sore throat, cough, congestion and headaches, has been feeling better today.  States she doesn't drink enough fluids as she should, generally eats only 2 meals a day  Non- sedentary lifestyle at work and at home, but does not exercise   at [employer] [employer] Planning on conceiving in the near future   05/15/2024: BPC today. Patient is asymptomatic. Patient denies interval CP, SOB, Palpitations, Lightheadedness, Headaches, Syncope. Homes blood pressures have been better controlled but no log was available.  Clinically stable.

## 2024-05-15 NOTE — CARDIOLOGY SUMMARY
[de-identified] :  6/14/23: Sinus  Rhythm  -Horizontal axis for age.   Voltage criteria for LVH  (R(I)+S(III) exceeds 2.50 mV)  -Voltage criteria w/o ST/T abnormality may be normal.   -  Diffuse nonspecific T-abnormality.   ABNORMAL   [de-identified] : 7/5/2023: LVEF 63% Trace TR, [place]

## 2024-06-05 NOTE — ED PROVIDER NOTE - INTERNATIONAL TRAVEL
Medical Necessity Information: It is in your best interest to select a reason for this procedure from the list below. All of these items fulfill various CMS LCD requirements except the new and changing color options. Post-Care Instructions: I reviewed with the patient in detail post-care instructions. Patient is to wear sunprotection, and avoid picking at any of the treated lesions. Pt may apply Vaseline to crusted or scabbing areas. Anesthesia Volume In Cc: 0 Render Post-Care Instructions In Note?: no Detail Level: Detailed Consent: The patient's consent was obtained including but not limited to risks of crusting, scabbing, blistering, scarring, darker or lighter pigmentary change, recurrence, incomplete removal and infection. Medical Necessity Clause: This procedure was medically necessary because the lesions that were treated were: No

## 2024-08-18 ENCOUNTER — NON-APPOINTMENT (OUTPATIENT)
Age: 34
End: 2024-08-18

## 2024-08-19 ENCOUNTER — EMERGENCY (EMERGENCY)
Facility: HOSPITAL | Age: 34
LOS: 1 days | Discharge: HOME | End: 2024-08-19
Attending: EMERGENCY MEDICINE | Admitting: EMERGENCY MEDICINE
Payer: COMMERCIAL

## 2024-08-19 VITALS
SYSTOLIC BLOOD PRESSURE: 137 MMHG | OXYGEN SATURATION: 100 % | HEART RATE: 63 BPM | DIASTOLIC BLOOD PRESSURE: 81 MMHG | RESPIRATION RATE: 18 BRPM

## 2024-08-19 VITALS
RESPIRATION RATE: 18 BRPM | OXYGEN SATURATION: 99 % | DIASTOLIC BLOOD PRESSURE: 83 MMHG | WEIGHT: 162.04 LBS | SYSTOLIC BLOOD PRESSURE: 125 MMHG | TEMPERATURE: 99 F | HEART RATE: 69 BPM

## 2024-08-19 LAB
ALBUMIN SERPL ELPH-MCNC: 4.1 G/DL — SIGNIFICANT CHANGE UP (ref 3.3–5)
ALP SERPL-CCNC: 53 U/L — SIGNIFICANT CHANGE UP (ref 40–120)
ALT FLD-CCNC: 10 U/L — SIGNIFICANT CHANGE UP (ref 4–33)
ANION GAP SERPL CALC-SCNC: 11 MMOL/L — SIGNIFICANT CHANGE UP (ref 7–14)
APPEARANCE UR: ABNORMAL
APTT BLD: 35.7 SEC — HIGH (ref 24.5–35.6)
AST SERPL-CCNC: 14 U/L — SIGNIFICANT CHANGE UP (ref 4–32)
BACTERIA # UR AUTO: ABNORMAL /HPF
BASOPHILS # BLD AUTO: 0.01 K/UL — SIGNIFICANT CHANGE UP (ref 0–0.2)
BASOPHILS NFR BLD AUTO: 0.2 % — SIGNIFICANT CHANGE UP (ref 0–2)
BILIRUB SERPL-MCNC: 0.3 MG/DL — SIGNIFICANT CHANGE UP (ref 0.2–1.2)
BILIRUB UR-MCNC: NEGATIVE — SIGNIFICANT CHANGE UP
BLD GP AB SCN SERPL QL: NEGATIVE — SIGNIFICANT CHANGE UP
BUN SERPL-MCNC: 13 MG/DL — SIGNIFICANT CHANGE UP (ref 7–23)
CALCIUM SERPL-MCNC: 9.4 MG/DL — SIGNIFICANT CHANGE UP (ref 8.4–10.5)
CAST: 0 /LPF — SIGNIFICANT CHANGE UP (ref 0–4)
CHLORIDE SERPL-SCNC: 102 MMOL/L — SIGNIFICANT CHANGE UP (ref 98–107)
CO2 SERPL-SCNC: 25 MMOL/L — SIGNIFICANT CHANGE UP (ref 22–31)
COLOR SPEC: YELLOW — SIGNIFICANT CHANGE UP
CREAT SERPL-MCNC: 0.79 MG/DL — SIGNIFICANT CHANGE UP (ref 0.5–1.3)
DIFF PNL FLD: ABNORMAL
EGFR: 100 ML/MIN/1.73M2 — SIGNIFICANT CHANGE UP
EOSINOPHIL # BLD AUTO: 0.2 K/UL — SIGNIFICANT CHANGE UP (ref 0–0.5)
EOSINOPHIL NFR BLD AUTO: 3 % — SIGNIFICANT CHANGE UP (ref 0–6)
GLUCOSE SERPL-MCNC: 89 MG/DL — SIGNIFICANT CHANGE UP (ref 70–99)
GLUCOSE UR QL: NEGATIVE MG/DL — SIGNIFICANT CHANGE UP
HCT VFR BLD CALC: 37.8 % — SIGNIFICANT CHANGE UP (ref 34.5–45)
HGB BLD-MCNC: 12.7 G/DL — SIGNIFICANT CHANGE UP (ref 11.5–15.5)
IANC: 4.13 K/UL — SIGNIFICANT CHANGE UP (ref 1.8–7.4)
IMM GRANULOCYTES NFR BLD AUTO: 0.3 % — SIGNIFICANT CHANGE UP (ref 0–0.9)
INR BLD: 1.11 RATIO — SIGNIFICANT CHANGE UP (ref 0.85–1.18)
KETONES UR-MCNC: 15 MG/DL
LEUKOCYTE ESTERASE UR-ACNC: NEGATIVE — SIGNIFICANT CHANGE UP
LIDOCAIN IGE QN: 9 U/L — SIGNIFICANT CHANGE UP (ref 7–60)
LYMPHOCYTES # BLD AUTO: 1.32 K/UL — SIGNIFICANT CHANGE UP (ref 1–3.3)
LYMPHOCYTES # BLD AUTO: 19.8 % — SIGNIFICANT CHANGE UP (ref 13–44)
MCHC RBC-ENTMCNC: 26.6 PG — LOW (ref 27–34)
MCHC RBC-ENTMCNC: 33.6 GM/DL — SIGNIFICANT CHANGE UP (ref 32–36)
MCV RBC AUTO: 79.2 FL — LOW (ref 80–100)
MONOCYTES # BLD AUTO: 0.97 K/UL — HIGH (ref 0–0.9)
MONOCYTES NFR BLD AUTO: 14.6 % — HIGH (ref 2–14)
NEUTROPHILS # BLD AUTO: 4.13 K/UL — SIGNIFICANT CHANGE UP (ref 1.8–7.4)
NEUTROPHILS NFR BLD AUTO: 62.1 % — SIGNIFICANT CHANGE UP (ref 43–77)
NITRITE UR-MCNC: NEGATIVE — SIGNIFICANT CHANGE UP
NRBC # BLD: 0 /100 WBCS — SIGNIFICANT CHANGE UP (ref 0–0)
NRBC # FLD: 0 K/UL — SIGNIFICANT CHANGE UP (ref 0–0)
PH UR: 6 — SIGNIFICANT CHANGE UP (ref 5–8)
PLATELET # BLD AUTO: 236 K/UL — SIGNIFICANT CHANGE UP (ref 150–400)
POTASSIUM SERPL-MCNC: 4.5 MMOL/L — SIGNIFICANT CHANGE UP (ref 3.5–5.3)
POTASSIUM SERPL-SCNC: 4.5 MMOL/L — SIGNIFICANT CHANGE UP (ref 3.5–5.3)
PROT SERPL-MCNC: 7.6 G/DL — SIGNIFICANT CHANGE UP (ref 6–8.3)
PROT UR-MCNC: 30 MG/DL
PROTHROM AB SERPL-ACNC: 12.4 SEC — SIGNIFICANT CHANGE UP (ref 9.5–13)
RBC # BLD: 4.77 M/UL — SIGNIFICANT CHANGE UP (ref 3.8–5.2)
RBC # FLD: 14 % — SIGNIFICANT CHANGE UP (ref 10.3–14.5)
RBC CASTS # UR COMP ASSIST: 12 /HPF — HIGH (ref 0–4)
RH IG SCN BLD-IMP: POSITIVE — SIGNIFICANT CHANGE UP
SODIUM SERPL-SCNC: 138 MMOL/L — SIGNIFICANT CHANGE UP (ref 135–145)
SP GR SPEC: 1.02 — SIGNIFICANT CHANGE UP (ref 1–1.03)
SQUAMOUS # UR AUTO: 20 /HPF — HIGH (ref 0–5)
UROBILINOGEN FLD QL: 1 MG/DL — SIGNIFICANT CHANGE UP (ref 0.2–1)
WBC # BLD: 6.65 K/UL — SIGNIFICANT CHANGE UP (ref 3.8–10.5)
WBC # FLD AUTO: 6.65 K/UL — SIGNIFICANT CHANGE UP (ref 3.8–10.5)
WBC UR QL: 1 /HPF — SIGNIFICANT CHANGE UP (ref 0–5)

## 2024-08-19 PROCEDURE — 99285 EMERGENCY DEPT VISIT HI MDM: CPT

## 2024-08-19 PROCEDURE — 76830 TRANSVAGINAL US NON-OB: CPT | Mod: 26

## 2024-08-19 PROCEDURE — 74177 CT ABD & PELVIS W/CONTRAST: CPT | Mod: 26,MC

## 2024-08-19 RX ORDER — METOCLOPRAMIDE HCL 5 MG/ML
10 VIAL (ML) INJECTION ONCE
Refills: 0 | Status: COMPLETED | OUTPATIENT
Start: 2024-08-19 | End: 2024-08-19

## 2024-08-19 RX ORDER — BACTERIOSTATIC SODIUM CHLORIDE 0.9 %
1000 VIAL (ML) INJECTION ONCE
Refills: 0 | Status: COMPLETED | OUTPATIENT
Start: 2024-08-19 | End: 2024-08-19

## 2024-08-19 RX ORDER — PIPERACILLIN SODIUM, TAZOBACTAM SODIUM 3; .375 G/15ML; G/15ML
3.38 INJECTION, POWDER, LYOPHILIZED, FOR SOLUTION INTRAVENOUS ONCE
Refills: 0 | Status: COMPLETED | OUTPATIENT
Start: 2024-08-19 | End: 2024-08-19

## 2024-08-19 RX ADMIN — Medication 1000 MILLILITER(S): at 11:55

## 2024-08-19 RX ADMIN — PIPERACILLIN SODIUM, TAZOBACTAM SODIUM 200 GRAM(S): 3; .375 INJECTION, POWDER, LYOPHILIZED, FOR SOLUTION INTRAVENOUS at 14:25

## 2024-08-19 RX ADMIN — Medication 10 MILLIGRAM(S): at 11:55

## 2024-08-19 NOTE — ED ADULT NURSE NOTE - ALCOHOL PRE SCREEN (AUDIT - C)
Time Dose Fractionation (Optional- Include Units If Applicable): 95 Bill For Radiation Treatment: Yes Dose Per Fractionation In Cgy (Optional): 270.45 Render Patient Eligibility And Selection In Note?: No Intro Statement (Will Not Render If Left Blank): The patient is undergoing superficial radiation therapy for skin cancer and presents for weekly evaluation and management.  Per protocol and as documented on the flow sheet, the patient was questioned as to subjective redness, pruritus, pain, drainage, fatigue, or any other symptoms.  Objectively, the radiation area was evaluated with regards to erythema, atrophy, scale, crusting, erosion, ulceration, edema, purpura, tenderness, warmth, drainage, and any other findings.  The plan was extensively reviewed including the dose, and dosing schedule.  The simulation and clinical setup was also reviewed as was the external and any internal shields and based on this review the appropriateness and sufficiency of treatment was determined. Fractions / Week Rx 2: 5 Total Number Of Fractions: 20 Prescription Used: 1 Pathology Override (Pathology Will Render As Diagnosis Name If Left Blank): BCC nodular Total Number Of Fractions Rx 3: 15 Functional Status: 0 (fully active) Depth (Optional-Please Include Units): 0.80 mm Assessment: Appropriate reaction Treatment Margins In Cm: 0.5 Custom Shielding Preamble Text Will Not Be Included With Simple Simulations (.......... X X Y Cm): A lead shield of 0.762 mm thickness is utilized to form a molded, custom shield with a Field Size (Applicator): 1.5 cm Treatment Device Design After Initial Simulation Justification (Will Render If Bill For Treatment Devices = Yes): The patient is status post radiation simulation and is evaluated as to the use of additional devices for shielding and placement for radiation therapy. Cumulative Dose In Cgy (Optional): 1893.15 Computed Treatment Time In Min (Will Render The Same As Calculated Treatment Time If Left Blank): 0.45 min Simple Simulation Preamble Text Will Be Included With Simple Simulations (.......... Indications): Simple simulation was performed today for the following reasons: Treatment Time In Min (Optional): 0.45 Energy (Optional-Please Include Units): 70 KV Day Of The Week Treatment Administered: Monday Energy (Include Units): 70kV Port Dimensions-X Axis In Cm: 0 Treatment Time / Fractionation (Optional- Include Units): 0.45 mi Detail Level: Zone Initial Radiation Treatment Planning (Will Render If Bill Simulation = Yes): The patient had a complete consultation regarding all applicable modalities for the treatment of their skin cancer and based on a variety of factors including the type of tumor, size, and location, the relevant medical history as well as local tissue factors, the functional status of the individual, the ability to perform necessary postoperative wound instructions and the need for simultaneous treatments as well as overall wound healing status, it was determined that the patient would begin radiation therapy treatment for skin cancer.  A full simulation and treatment device design was performed including the determination and formulation of appropriate simple and complex devices including lead shield of 0.762 mm thickness to form molded customized shielding to specifically correlate with the lesion size including treatment margin.  The custom lead shield is adequate to accommodate the appropriate applicator and provide adequate shielding around the treatment site.  The specific field applicator, shields, and devices both simple and complex as well as the specific patient setup is outlined below.  The patient was given a full consent for superficial radiation to both verbally and in writing and the full determination of patient's eligibility for treatment and selection is outlined on the patient eligibility and treatment selection form.  The specific superficial radiotherapy prescription was determined and was documented on the superficial radiotherapy prescription form.  A treatment calculation was also performed and documented on the treatment calculation form.  Based on the prescription, the patient was scheduled for a series of fractional treatments. Daily Fractionated Dose (Optional- Include Units): 270.45 cGy Total Dose (Optional-Please Include Units): 5409.0 Patient Positioning: Sitting Statement Selected Custom Shielding Afterword Text Will Not Be Included With Simple Simulations (X X Y Cm............): port to correlate with the lesion size, including treatment margin. The custom lead shield is adequate to accommodate the appropriate applicator and provide adequate shielding around the treatment site. Additional shielding (as noted below) is used to protect sensitive, normal tissues. Information: Selecting Yes will display possible errors in your note based on the variables you have selected. This validation is only offered as a suggestion for you. PLEASE NOTE THAT THE VALIDATION TEXT WILL BE REMOVED WHEN YOU FINALIZE YOUR NOTE. IF YOU WANT TO FAX A PRELIMINARY NOTE YOU WILL NEED TO TOGGLE THIS TO 'NO' IF YOU DO NOT WANT IT IN YOUR FAXED NOTE. Comments: On [employer], RTOG 1 Fractionation Number: 8

## 2024-08-19 NOTE — ED ADULT TRIAGE NOTE - CHIEF COMPLAINT QUOTE
Pt presents to ED ambulatory from urgent care with c/o RLQ abdominal pain x 2 days. Pt was seen at urgent care and advised to come to ED for further eval. Pt reports hx of ovarian cyst.

## 2024-08-19 NOTE — ED PROVIDER NOTE - OBJECTIVE STATEMENT
34 yo female with pmhx of PCOS presents to the ED with RLQ abd pain x3 days. pt states the pain has been getting worse with one episode of vomiting this morning and fever at 3am where she took tylenols with symptomatic relief. she states she was seen in [facility] this morning and was referred to the ED for further evaluation. pt denies urinary symptoms, vaginal bleeding or discharge.

## 2024-08-19 NOTE — ED PROVIDER NOTE - NSFOLLOWUPINSTRUCTIONS_ED_ALL_ED_FT
Continue all previously prescribed medications as directed unless otherwise instructed.  Follow up with your primary care physician in 48-72 hours- bring copies of your results that we discussed. Start taking the antibiotics as prescribed to you.  Return to the ER for worsening or persistent symptoms, and/or ANY NEW OR CONCERNING SYMPTOMS. If you have issues obtaining follow up, please call: [phone] to obtain a doctor or specialist who takes your insurance in your area.  You may call [phone] to make an appointment with the internal medicine clinic.

## 2024-08-19 NOTE — ED PROVIDER NOTE - PROGRESS NOTE DETAILS
Pt reassessed at bedside, feels well, pain controlled. Informed of workup in ED, reviewed lab and/or radiology results (when applicable) with patient/caregiver. Informed pt of plan for discharge with instructions to follow up with PMD. Pt/caregiver expressed understanding of plan and agrees with plan for discharge. Strict return precautions discussed with patient in layman's terms, patient demonstrated understanding of return precautions. Jeannette Malcolm PA-C

## 2024-08-19 NOTE — ED PROVIDER NOTE - CLINICAL SUMMARY MEDICAL DECISION MAKING FREE TEXT BOX
32 yo female with RLQ abd pain with N/V and fever.   r/o appendicitis   will obtain labs, UA, CT, US

## 2024-08-19 NOTE — ED PROVIDER NOTE - PATIENT PORTAL LINK FT
You can access the FollowMyHealth Patient Portal offered by [facility] by registering at the following website: [email or web address]. By joining [employer]’s FollowMyHealth portal, you will also be able to view your health information using other applications (apps) compatible with our system.

## 2024-08-19 NOTE — ED PROVIDER NOTE - ATTENDING APP SHARED VISIT CONTRIBUTION OF CARE
Attending note:   After face to face evaluation of this patient, I concur with above noted hx, pe, and care plan for this patient.  Laws: 33 yof with hx of PCOS. Pt presents to the ED with 3 days of RLQ pain and now with nausea, vomiting and fever. Pt took some Tylenol with some relief. Pt was seen at [facility] and referred to [facility] for CT scam for r/o appy. Pt denies any urinary complaints, or vaginal bleeding or diarrhea. Pt is overall well appearing in minimal distress, clear lungs, RRR, abd soft is soft with nml BS, there no tn in CVAT but tender in RLQ tn. Concern for torsion vs appy - labs, UA CT and US ordered. pain meds, Reassess after.

## 2024-08-19 NOTE — ED ADULT NURSE NOTE - NSFALLUNIVINTERV_ED_ALL_ED
Bed/Stretcher in lowest position, wheels locked, appropriate side rails in place/Call bell, personal items and telephone in reach/Instruct patient to call for assistance before getting out of bed/chair/stretcher/Non-slip footwear applied when patient is off stretcher/[place] to call system/Physically safe environment - no spills, clutter or unnecessary equipment/Purposeful proactive rounding/Room/bathroom lighting operational, light cord in reach

## 2024-08-19 NOTE — ED ADULT NURSE NOTE - OBJECTIVE STATEMENT
Received pt to intake 6, A+Ox4, ambulatory. C/O RLQ abdominal pain x 2 days. pt endorsing fevers, nausea and headache. ABD is soft, very tender, non distended. Respirations even and unlabored, normal work of breathing, no accessory muscle use, speaking in full clear uninterrupted sentences. strength and sensation equal bilaterally. Pt denies any chest pain, SOB, dizziness.  20G to AC, Labs sent, Medicated as per Provider orders, plan of care ongoing, no further concerns as of present, patient expresses no other needs at this time.

## 2024-08-20 LAB
CULTURE RESULTS: SIGNIFICANT CHANGE UP
SPECIMEN SOURCE: SIGNIFICANT CHANGE UP

## 2024-08-31 ENCOUNTER — APPOINTMENT (OUTPATIENT)
Dept: INTERNAL MEDICINE | Facility: CLINIC | Age: 34
End: 2024-08-31

## 2024-09-05 ENCOUNTER — APPOINTMENT (OUTPATIENT)
Dept: INTERNAL MEDICINE | Facility: CLINIC | Age: 34
End: 2024-09-05
Payer: COMMERCIAL

## 2024-09-05 VITALS
WEIGHT: 165 LBS | HEIGHT: 66 IN | BODY MASS INDEX: 26.52 KG/M2 | DIASTOLIC BLOOD PRESSURE: 78 MMHG | OXYGEN SATURATION: 98 % | HEART RATE: 51 BPM | SYSTOLIC BLOOD PRESSURE: 110 MMHG

## 2024-09-05 DIAGNOSIS — I10 ESSENTIAL (PRIMARY) HYPERTENSION: ICD-10-CM

## 2024-09-05 DIAGNOSIS — R10.9 UNSPECIFIED ABDOMINAL PAIN: ICD-10-CM

## 2024-09-05 DIAGNOSIS — D57.3 SICKLE-CELL TRAIT: ICD-10-CM

## 2024-09-05 PROCEDURE — 99213 OFFICE O/P EST LOW 20 MIN: CPT

## 2024-09-05 NOTE — REVIEW OF SYSTEMS
[Recent Change In Weight] : ~T no recent weight change [Abdominal Pain] : abdominal pain [Vomiting] : vomiting [Negative] : Genitourinary [FreeTextEntry2] : as noted in HPI

## 2024-09-05 NOTE — PLAN
[FreeTextEntry1] : Abdominal pain and fever symptom improved with antibiotics Monitor for recurrence  Mesenteric lymphadenopathy LIkely due to infection. No  other symtoms. If abdominal symptom recur will need to evaluate further  Hypertension Blood pressure is controlled on nifedipine 30 mg.

## 2024-09-05 NOTE — HEALTH RISK ASSESSMENT
[0] : 2) Feeling down, depressed, or hopeless: Not at all (0) [PHQ-2 Negative - No further assessment needed] : PHQ-2 Negative - No further assessment needed [[id number]] : 0 [Never] : Never

## 2024-09-05 NOTE — HEALTH RISK ASSESSMENT
[0] : 2) Feeling down, depressed, or hopeless: Not at all (0) [PHQ-2 Negative - No further assessment needed] : PHQ-2 Negative - No further assessment needed [[id number]] : 0 [Never] : Never

## 2024-09-05 NOTE — HISTORY OF PRESENT ILLNESS
[FreeTextEntry1] : f/u ER visit [de-identified] : 33-year-old female with history of sickle cell trait, PCOS, hypertension and bradycardia who presents for follow up after an ER visit. On 8/19/24 She went to  with abdominal pain and fever. She was sent to the ER to r/o appendicitis. She had a CT which showed mesenteric lymph nodes and bladder wall thickening. Urine culture negative. She was discharged on antibiotics for 3 days. Her symptoms improved.  pap 4/23

## 2024-09-05 NOTE — HISTORY OF PRESENT ILLNESS
[FreeTextEntry1] : f/u ER visit [de-identified] : 33-year-old female with history of sickle cell trait, PCOS, hypertension and bradycardia who presents for follow up after an ER visit. On 8/19/24 She went to  with abdominal pain and fever. She was sent to the ER to r/o appendicitis. She had a CT which showed mesenteric lymph nodes and bladder wall thickening. Urine culture negative. She was discharged on antibiotics for 3 days. Her symptoms improved.  pap 4/23

## 2024-09-05 NOTE — HEALTH RISK ASSESSMENT
[0] : 2) Feeling down, depressed, or hopeless: Not at all (0) [PHQ-2 Negative - No further assessment needed] : PHQ-2 Negative - No further assessment needed [[id number]] : 0 [Never] : Never

## 2024-09-05 NOTE — HISTORY OF PRESENT ILLNESS
[FreeTextEntry1] : f/u ER visit [de-identified] : 33-year-old female with history of sickle cell trait, PCOS, hypertension and bradycardia who presents for follow up after an ER visit. On 8/19/24 She went to  with abdominal pain and fever. She was sent to the ER to r/o appendicitis. She had a CT which showed mesenteric lymph nodes and bladder wall thickening. Urine culture negative. She was discharged on antibiotics for 3 days. Her symptoms improved.  pap 4/23

## 2024-09-11 NOTE — ED CDU PROVIDER DISPOSITION NOTE - NS_EDPROVIDERDISPOUSERTYPE_ED_A_ED
Medication:   Requested Prescriptions     Pending Prescriptions Disp Refills    lisinopril (PRINIVIL;ZESTRIL) 40 MG tablet [Pharmacy Med Name: LISINOPRIL 40 MG TABLET] 30 tablet 3     Sig: TAKE ONE TABLET BY MOUTH DAILY       Last OV:04/05/2019  Last Lab:10/08/2018 [facility] contacted [phone] Wilma, relayed home address as [address] [place] [address]     PCP is Dr Corbett of the [facility] [address] [place] [address] [phone]    Please fax DC Summary when available before discharge to [phone] hard fax or [employer] to    Attending Attestation (For Attendings USE Only)...

## 2024-10-16 ENCOUNTER — NON-APPOINTMENT (OUTPATIENT)
Age: 34
End: 2024-10-16

## 2024-10-16 ENCOUNTER — APPOINTMENT (OUTPATIENT)
Dept: CARDIOLOGY | Facility: CLINIC | Age: 34
End: 2024-10-16
Payer: COMMERCIAL

## 2024-10-16 VITALS
HEART RATE: 58 BPM | WEIGHT: 163 LBS | DIASTOLIC BLOOD PRESSURE: 82 MMHG | SYSTOLIC BLOOD PRESSURE: 120 MMHG | BODY MASS INDEX: 26.2 KG/M2 | HEIGHT: 66 IN | OXYGEN SATURATION: 99 %

## 2024-10-16 DIAGNOSIS — I10 ESSENTIAL (PRIMARY) HYPERTENSION: ICD-10-CM

## 2024-10-16 PROCEDURE — 93000 ELECTROCARDIOGRAM COMPLETE: CPT

## 2024-10-16 PROCEDURE — G2211 COMPLEX E/M VISIT ADD ON: CPT

## 2024-10-16 PROCEDURE — 99213 OFFICE O/P EST LOW 20 MIN: CPT

## 2024-11-13 ENCOUNTER — TRANSCRIPTION ENCOUNTER (OUTPATIENT)
Age: 34
End: 2024-11-13

## 2024-11-13 RX ORDER — TRIAMCINOLONE ACETONIDE 1 MG/G
0.1 OINTMENT TOPICAL TWICE DAILY
Qty: 1 | Refills: 5 | Status: ACTIVE | COMMUNITY
Start: 2024-11-13 | End: 1900-01-01

## 2024-12-24 ENCOUNTER — APPOINTMENT (OUTPATIENT)
Dept: INTERNAL MEDICINE | Facility: CLINIC | Age: 34
End: 2024-12-24
Payer: COMMERCIAL

## 2024-12-24 VITALS
SYSTOLIC BLOOD PRESSURE: 130 MMHG | BODY MASS INDEX: 26.03 KG/M2 | HEART RATE: 61 BPM | OXYGEN SATURATION: 98 % | TEMPERATURE: 98 F | WEIGHT: 162 LBS | DIASTOLIC BLOOD PRESSURE: 91 MMHG | HEIGHT: 66 IN

## 2024-12-24 VITALS — DIASTOLIC BLOOD PRESSURE: 82 MMHG | SYSTOLIC BLOOD PRESSURE: 120 MMHG

## 2024-12-24 DIAGNOSIS — Z02.1 ENCOUNTER FOR PRE-EMPLOYMENT EXAMINATION: ICD-10-CM

## 2024-12-24 DIAGNOSIS — Z78.9 OTHER SPECIFIED HEALTH STATUS: ICD-10-CM

## 2024-12-24 DIAGNOSIS — I10 ESSENTIAL (PRIMARY) HYPERTENSION: ICD-10-CM

## 2024-12-24 LAB
APPEARANCE: CLEAR
BACTERIA: NEGATIVE /HPF
BASOPHILS # BLD AUTO: 0.05 K/UL
BASOPHILS NFR BLD AUTO: 1.2 %
BILIRUBIN URINE: NEGATIVE
BLOOD URINE: NEGATIVE
CAST: 0 /LPF
COLOR: YELLOW
EOSINOPHIL # BLD AUTO: 0.12 K/UL
EOSINOPHIL NFR BLD AUTO: 2.9 %
EPITHELIAL CELLS: 1 /HPF
GLUCOSE QUALITATIVE U: NEGATIVE MG/DL
HBV SURFACE AB SER QL: REACTIVE
HCT VFR BLD CALC: 38.9 %
HGB BLD-MCNC: 12.9 G/DL
IMM GRANULOCYTES NFR BLD AUTO: 0 %
KETONES URINE: NEGATIVE MG/DL
LEUKOCYTE ESTERASE URINE: NEGATIVE
LYMPHOCYTES # BLD AUTO: 1.91 K/UL
LYMPHOCYTES NFR BLD AUTO: 45.4 %
MAN DIFF?: NORMAL
MCHC RBC-ENTMCNC: 26.9 PG
MCHC RBC-ENTMCNC: 33.2 G/DL
MCV RBC AUTO: 81.2 FL
MICROSCOPIC-UA: NORMAL
MONOCYTES # BLD AUTO: 0.29 K/UL
MONOCYTES NFR BLD AUTO: 6.9 %
NEUTROPHILS # BLD AUTO: 1.84 K/UL
NEUTROPHILS NFR BLD AUTO: 43.6 %
NITRITE URINE: NEGATIVE
PH URINE: 7.5
PLATELET # BLD AUTO: 343 K/UL
PROTEIN URINE: NEGATIVE MG/DL
RBC # BLD: 4.79 M/UL
RBC # FLD: 14.7 %
RED BLOOD CELLS URINE: 1 /HPF
SPECIFIC GRAVITY URINE: 1.01
UROBILINOGEN URINE: 0.2 MG/DL
WBC # FLD AUTO: 4.21 K/UL
WHITE BLOOD CELLS URINE: 0 /HPF

## 2024-12-24 PROCEDURE — 99214 OFFICE O/P EST MOD 30 MIN: CPT

## 2024-12-25 LAB
MEV IGG FLD QL IA: 172 AU/ML
MEV IGG+IGM SER-IMP: POSITIVE
MUV AB SER-ACNC: POSITIVE
MUV IGG SER QL IA: 83.1 AU/ML
RUBV IGG FLD-ACNC: 5.43 INDEX
RUBV IGG SER-IMP: POSITIVE
VZV AB TITR SER: POSITIVE
VZV IGG SER IF-ACNC: 14.3 S/CO

## 2024-12-29 LAB
M TB IFN-G BLD-IMP: NEGATIVE
QUANTIFERON TB PLUS MITOGEN MINUS NIL: >10 IU/ML
QUANTIFERON TB PLUS NIL: 0.09 IU/ML
QUANTIFERON TB PLUS TB1 MINUS NIL: 0 IU/ML
QUANTIFERON TB PLUS TB2 MINUS NIL: 0 IU/ML

## 2024-12-30 DIAGNOSIS — Z11.59 ENCOUNTER FOR SCREENING FOR OTHER VIRAL DISEASES: ICD-10-CM

## 2024-12-30 DIAGNOSIS — B19.20 UNSPECIFIED VIRAL HEPATITIS C W/OUT HEPATIC COMA: ICD-10-CM

## 2025-01-07 ENCOUNTER — NON-APPOINTMENT (OUTPATIENT)
Age: 35
End: 2025-01-07

## 2025-01-07 LAB — HBV SURFACE AG SER QL: NONREACTIVE

## 2025-01-08 LAB
HCV RNA FLD QL NAA+PROBE: NORMAL
HCV RNA SPEC QL PROBE+SIG AMP: NOT DETECTED

## 2025-01-09 ENCOUNTER — NON-APPOINTMENT (OUTPATIENT)
Age: 35
End: 2025-01-09

## 2025-01-28 ENCOUNTER — ASOB RESULT (OUTPATIENT)
Age: 35
End: 2025-01-28

## 2025-01-28 ENCOUNTER — RESULT CHARGE (OUTPATIENT)
Age: 35
End: 2025-01-28

## 2025-01-28 ENCOUNTER — APPOINTMENT (OUTPATIENT)
Facility: CLINIC | Age: 35
End: 2025-01-28

## 2025-01-28 VITALS — DIASTOLIC BLOOD PRESSURE: 80 MMHG | SYSTOLIC BLOOD PRESSURE: 132 MMHG

## 2025-01-28 LAB — HCG UR QL: POSITIVE

## 2025-01-28 PROCEDURE — 99459 PELVIC EXAMINATION: CPT

## 2025-01-28 PROCEDURE — 99214 OFFICE O/P EST MOD 30 MIN: CPT | Mod: 25

## 2025-01-28 RX ORDER — VITAMIN A ACETATE, ASCORBIC ACID, CHOLECALCIFEROL, ALPHA-TOCOPHEROL ACETATE, DL, THIAMINE MONONITRATE, RIBOFLAVIN, NIACINAMIDE, PYRIDOXINE HYDROCHLORIDE, FOLIC ACID, CYANOCOBALAMIN, BIOTIN, CALCIUM PANTOTHENATE, CALCIUM CARBONATE, FERROUS FUMARATE, POTASSIUM IODIDE, MAGNESIUM OXIDE, ZINC OXIDE AND CUPRIC OXIDE 2500; 80; 400; 10; 3; 3.4; 20; 20; 1; 12; 300; 6; 120; 27; 150; 30; 15; 2 [IU]/1; MG/1; [IU]/1; [IU]/1; MG/1; MG/1; MG/1; MG/1; MG/1; UG/1; UG/1; MG/1; MG/1; MG/1; UG/1; UG/1; UG/1; MG/1
27-0.6-0.4 TABLET, FILM COATED ORAL DAILY
Qty: 120 | Refills: 1 | Status: ACTIVE | COMMUNITY
Start: 2025-01-28 | End: 1900-01-01

## 2025-01-30 DIAGNOSIS — N91.2 AMENORRHEA, UNSPECIFIED: ICD-10-CM

## 2025-01-31 ENCOUNTER — NON-APPOINTMENT (OUTPATIENT)
Age: 35
End: 2025-01-31

## 2025-01-31 DIAGNOSIS — O03.9 COMPLETE OR UNSPECIFIED SPONTANEOUS ABORTION W/OUT COMPLICATION: ICD-10-CM

## 2025-01-31 DIAGNOSIS — Z87.59 PERSONAL HISTORY OF OTHER COMPLICATIONS OF PREGNANCY, CHILDBIRTH AND THE PUERPERIUM: ICD-10-CM

## 2025-01-31 DIAGNOSIS — Z98.890 OTHER SPECIFIED POSTPROCEDURAL STATES: ICD-10-CM

## 2025-01-31 DIAGNOSIS — Z83.438 FAMILY HISTORY OF OTHER DISORDER OF LIPOPROTEIN METABOLISM AND OTHER LIPIDEMIA: ICD-10-CM

## 2025-02-03 ENCOUNTER — APPOINTMENT (OUTPATIENT)
Facility: CLINIC | Age: 35
End: 2025-02-03

## 2025-02-06 LAB
BACTERIA UR CULT: NORMAL
HCG SERPL QL: NEGATIVE
HCG SERPL QL: POSITIVE
HCG SERPL-MCNC: 72 MIU/ML
PAPP-A SERPL-ACNC: 3 MIU/ML
PAPP-A SERPL-ACNC: 31 MIU/ML
PROGEST SERPL-MCNC: 0.7 NG/ML

## 2025-02-12 ENCOUNTER — TRANSCRIPTION ENCOUNTER (OUTPATIENT)
Age: 35
End: 2025-02-12

## 2025-03-19 ENCOUNTER — NON-APPOINTMENT (OUTPATIENT)
Age: 35
End: 2025-03-19

## 2025-03-19 RX ORDER — MISOPROSTOL 200 UG/1
200 TABLET ORAL
Qty: 1 | Refills: 0 | Status: ACTIVE | COMMUNITY
Start: 2025-03-19 | End: 1900-01-01

## 2025-03-20 ENCOUNTER — APPOINTMENT (OUTPATIENT)
Facility: CLINIC | Age: 35
End: 2025-03-20

## 2025-03-20 ENCOUNTER — ASOB RESULT (OUTPATIENT)
Age: 35
End: 2025-03-20

## 2025-03-20 ENCOUNTER — APPOINTMENT (OUTPATIENT)
Facility: CLINIC | Age: 35
End: 2025-03-20
Payer: COMMERCIAL

## 2025-03-20 VITALS — DIASTOLIC BLOOD PRESSURE: 75 MMHG | SYSTOLIC BLOOD PRESSURE: 115 MMHG

## 2025-03-20 DIAGNOSIS — N96 RECURRENT PREGNANCY LOSS: ICD-10-CM

## 2025-03-20 LAB — HCG UR QL: NEGATIVE

## 2025-03-20 PROCEDURE — 76998 US GUIDE INTRAOP: CPT

## 2025-03-20 PROCEDURE — 81025 URINE PREGNANCY TEST: CPT

## 2025-03-20 PROCEDURE — 58558Z: CUSTOM

## 2025-03-25 LAB — CORE LAB BIOPSY: NORMAL

## 2025-04-10 ENCOUNTER — APPOINTMENT (OUTPATIENT)
Facility: CLINIC | Age: 35
End: 2025-04-10
Payer: COMMERCIAL

## 2025-04-10 DIAGNOSIS — N96 RECURRENT PREGNANCY LOSS: ICD-10-CM

## 2025-04-10 PROCEDURE — 99212 OFFICE O/P EST SF 10 MIN: CPT | Mod: 95

## 2025-04-17 ENCOUNTER — APPOINTMENT (OUTPATIENT)
Dept: CARDIOLOGY | Facility: CLINIC | Age: 35
End: 2025-04-17
Payer: COMMERCIAL

## 2025-04-17 ENCOUNTER — NON-APPOINTMENT (OUTPATIENT)
Age: 35
End: 2025-04-17

## 2025-04-17 VITALS
SYSTOLIC BLOOD PRESSURE: 110 MMHG | HEART RATE: 50 BPM | WEIGHT: 171 LBS | HEIGHT: 66 IN | BODY MASS INDEX: 27.48 KG/M2 | DIASTOLIC BLOOD PRESSURE: 70 MMHG | OXYGEN SATURATION: 98 %

## 2025-04-17 DIAGNOSIS — I10 ESSENTIAL (PRIMARY) HYPERTENSION: ICD-10-CM

## 2025-04-17 PROCEDURE — 99213 OFFICE O/P EST LOW 20 MIN: CPT

## 2025-04-17 PROCEDURE — G2211 COMPLEX E/M VISIT ADD ON: CPT

## 2025-04-17 PROCEDURE — 93000 ELECTROCARDIOGRAM COMPLETE: CPT

## 2025-04-24 ENCOUNTER — APPOINTMENT (OUTPATIENT)
Facility: CLINIC | Age: 35
End: 2025-04-24
Payer: COMMERCIAL

## 2025-04-24 VITALS — SYSTOLIC BLOOD PRESSURE: 123 MMHG | DIASTOLIC BLOOD PRESSURE: 88 MMHG

## 2025-04-24 DIAGNOSIS — Z01.419 ENCOUNTER FOR GYNECOLOGICAL EXAMINATION (GENERAL) (ROUTINE) W/OUT ABNORMAL FINDINGS: ICD-10-CM

## 2025-04-24 DIAGNOSIS — N96 RECURRENT PREGNANCY LOSS: ICD-10-CM

## 2025-04-24 DIAGNOSIS — Z11.3 ENCOUNTER FOR SCREENING FOR INFECTIONS WITH A PREDOMINANTLY SEXUAL MODE OF TRANSMISSION: ICD-10-CM

## 2025-04-24 LAB — HCG UR QL: NEGATIVE

## 2025-04-24 PROCEDURE — 99459 PELVIC EXAMINATION: CPT

## 2025-04-24 PROCEDURE — 99395 PREV VISIT EST AGE 18-39: CPT

## 2025-04-24 PROCEDURE — G0444 DEPRESSION SCREEN ANNUAL: CPT | Mod: 59

## 2025-04-24 PROCEDURE — 81025 URINE PREGNANCY TEST: CPT

## 2025-04-25 ENCOUNTER — APPOINTMENT (OUTPATIENT)
Facility: CLINIC | Age: 35
End: 2025-04-25

## 2025-04-25 LAB
BV BACTERIA RRNA VAG QL NAA+PROBE: NOT DETECTED
C GLABRATA RNA VAG QL NAA+PROBE: NOT DETECTED
C TRACH RRNA SPEC QL NAA+PROBE: NOT DETECTED
CANDIDA RRNA VAG QL PROBE: NOT DETECTED
HPV HIGH+LOW RISK DNA PNL CVX: NOT DETECTED
N GONORRHOEA RRNA SPEC QL NAA+PROBE: NOT DETECTED
SOURCE AMPLIFICATION: NORMAL
T VAGINALIS RRNA SPEC QL NAA+PROBE: NOT DETECTED

## 2025-04-28 ENCOUNTER — NON-APPOINTMENT (OUTPATIENT)
Age: 35
End: 2025-04-28

## 2025-04-28 ENCOUNTER — APPOINTMENT (OUTPATIENT)
Dept: HUMAN REPRODUCTION | Facility: CLINIC | Age: 35
End: 2025-04-28

## 2025-04-28 DIAGNOSIS — Z80.49 FAMILY HISTORY OF MALIGNANT NEOPLASM OF OTHER GENITAL ORGANS: ICD-10-CM

## 2025-04-30 LAB — CYTOLOGY CVX/VAG DOC THIN PREP: NORMAL

## 2025-05-15 ENCOUNTER — APPOINTMENT (OUTPATIENT)
Facility: CLINIC | Age: 35
End: 2025-05-15
Payer: COMMERCIAL

## 2025-05-15 VITALS — SYSTOLIC BLOOD PRESSURE: 132 MMHG | DIASTOLIC BLOOD PRESSURE: 81 MMHG

## 2025-05-15 DIAGNOSIS — N76.0 ACUTE VAGINITIS: ICD-10-CM

## 2025-05-15 LAB — HCG UR QL: NEGATIVE

## 2025-05-15 PROCEDURE — 81025 URINE PREGNANCY TEST: CPT

## 2025-05-15 PROCEDURE — 99213 OFFICE O/P EST LOW 20 MIN: CPT

## 2025-05-15 PROCEDURE — 99459 PELVIC EXAMINATION: CPT

## 2025-05-16 LAB
C TRACH RRNA SPEC QL NAA+PROBE: NOT DETECTED
N GONORRHOEA RRNA SPEC QL NAA+PROBE: NOT DETECTED
SOURCE AMPLIFICATION: NORMAL

## 2025-05-17 LAB
BACTERIA UR CULT: ABNORMAL
BV BACTERIA RRNA VAG QL NAA+PROBE: DETECTED
C GLABRATA RNA VAG QL NAA+PROBE: NOT DETECTED
CANDIDA RRNA VAG QL PROBE: DETECTED
T VAGINALIS RRNA SPEC QL NAA+PROBE: NOT DETECTED

## 2025-05-19 RX ORDER — METRONIDAZOLE 500 MG/1
500 TABLET ORAL TWICE DAILY
Qty: 14 | Refills: 0 | Status: ACTIVE | COMMUNITY
Start: 2025-05-19 | End: 1900-01-01

## 2025-05-22 ENCOUNTER — APPOINTMENT (OUTPATIENT)
Dept: INTERNAL MEDICINE | Facility: CLINIC | Age: 35
End: 2025-05-22
Payer: COMMERCIAL

## 2025-05-22 VITALS
DIASTOLIC BLOOD PRESSURE: 74 MMHG | BODY MASS INDEX: 27.48 KG/M2 | WEIGHT: 171 LBS | SYSTOLIC BLOOD PRESSURE: 124 MMHG | OXYGEN SATURATION: 98 % | HEIGHT: 66 IN | HEART RATE: 60 BPM

## 2025-05-22 DIAGNOSIS — Z86.19 PERSONAL HISTORY OF OTHER INFECTIOUS AND PARASITIC DISEASES: ICD-10-CM

## 2025-05-22 DIAGNOSIS — Z00.00 ENCOUNTER FOR GENERAL ADULT MEDICAL EXAMINATION W/OUT ABNORMAL FINDINGS: ICD-10-CM

## 2025-05-22 DIAGNOSIS — Z13.1 ENCOUNTER FOR SCREENING FOR DIABETES MELLITUS: ICD-10-CM

## 2025-05-22 DIAGNOSIS — Z13.0 ENCOUNTER FOR SCREENING FOR DISEASES OF THE BLOOD AND BLOOD-FORMING ORGANS AND CERTAIN DISORDERS INVOLVING THE IMMUNE MECHANISM: ICD-10-CM

## 2025-05-22 DIAGNOSIS — Z13.29 ENCOUNTER FOR SCREENING FOR OTHER SUSPECTED ENDOCRINE DISORDER: ICD-10-CM

## 2025-05-22 DIAGNOSIS — Z13.220 ENCOUNTER FOR SCREENING FOR LIPOID DISORDERS: ICD-10-CM

## 2025-05-22 PROCEDURE — 99395 PREV VISIT EST AGE 18-39: CPT

## 2025-05-22 RX ORDER — TERCONAZOLE 8 MG/G
0.8 CREAM VAGINAL
Qty: 1 | Refills: 0 | Status: DISCONTINUED | COMMUNITY
Start: 2025-05-15 | End: 2025-05-22

## 2025-05-23 NOTE — REASON FOR VISIT
Order Macrobid 100mg BID for 5 days per Dr. Funes.  Patient notified, advised to contact our office if symptoms persist.   [Initial Evaluation] : an initial evaluation of [Abnormal ECG] : an abnormal ECG [Chest Pain] : chest pain [Hypertension] : hypertension

## 2025-06-10 ENCOUNTER — APPOINTMENT (OUTPATIENT)
Dept: HUMAN REPRODUCTION | Facility: CLINIC | Age: 35
End: 2025-06-10

## 2025-06-23 ENCOUNTER — LABORATORY RESULT (OUTPATIENT)
Age: 35
End: 2025-06-23

## 2025-06-26 ENCOUNTER — TRANSCRIPTION ENCOUNTER (OUTPATIENT)
Age: 35
End: 2025-06-26

## 2025-06-27 ENCOUNTER — NON-APPOINTMENT (OUTPATIENT)
Age: 35
End: 2025-06-27

## 2025-07-03 ENCOUNTER — NON-APPOINTMENT (OUTPATIENT)
Age: 35
End: 2025-07-03